# Patient Record
Sex: FEMALE | Race: OTHER | NOT HISPANIC OR LATINO
[De-identification: names, ages, dates, MRNs, and addresses within clinical notes are randomized per-mention and may not be internally consistent; named-entity substitution may affect disease eponyms.]

---

## 2017-07-07 ENCOUNTER — TRANSCRIPTION ENCOUNTER (OUTPATIENT)
Age: 67
End: 2017-07-07

## 2017-09-27 VITALS
HEART RATE: 81 BPM | HEIGHT: 63 IN | WEIGHT: 170 LBS | DIASTOLIC BLOOD PRESSURE: 82 MMHG | RESPIRATION RATE: 15 BRPM | BODY MASS INDEX: 30.12 KG/M2 | SYSTOLIC BLOOD PRESSURE: 147 MMHG

## 2017-11-28 ENCOUNTER — RECORD ABSTRACTING (OUTPATIENT)
Age: 67
End: 2017-11-28

## 2017-11-28 DIAGNOSIS — Z87.09 PERSONAL HISTORY OF OTHER DISEASES OF THE RESPIRATORY SYSTEM: ICD-10-CM

## 2017-12-21 ENCOUNTER — APPOINTMENT (OUTPATIENT)
Dept: ENDOCRINOLOGY | Facility: CLINIC | Age: 67
End: 2017-12-21

## 2018-04-26 ENCOUNTER — APPOINTMENT (OUTPATIENT)
Dept: ENDOCRINOLOGY | Facility: CLINIC | Age: 68
End: 2018-04-26

## 2018-07-18 ENCOUNTER — APPOINTMENT (OUTPATIENT)
Dept: INTERNAL MEDICINE | Facility: CLINIC | Age: 68
End: 2018-07-18

## 2018-08-20 ENCOUNTER — APPOINTMENT (OUTPATIENT)
Dept: INTERNAL MEDICINE | Facility: CLINIC | Age: 68
End: 2018-08-20
Payer: COMMERCIAL

## 2018-08-20 ENCOUNTER — NON-APPOINTMENT (OUTPATIENT)
Age: 68
End: 2018-08-20

## 2018-08-20 VITALS
TEMPERATURE: 97.8 F | WEIGHT: 173 LBS | OXYGEN SATURATION: 97 % | BODY MASS INDEX: 30.65 KG/M2 | SYSTOLIC BLOOD PRESSURE: 160 MMHG | DIASTOLIC BLOOD PRESSURE: 80 MMHG | HEART RATE: 62 BPM | RESPIRATION RATE: 16 BRPM | HEIGHT: 63 IN

## 2018-08-20 DIAGNOSIS — Z80.0 FAMILY HISTORY OF MALIGNANT NEOPLASM OF DIGESTIVE ORGANS: ICD-10-CM

## 2018-08-20 DIAGNOSIS — Z82.49 FAMILY HISTORY OF ISCHEMIC HEART DISEASE AND OTHER DISEASES OF THE CIRCULATORY SYSTEM: ICD-10-CM

## 2018-08-20 DIAGNOSIS — Z80.3 FAMILY HISTORY OF MALIGNANT NEOPLASM OF BREAST: ICD-10-CM

## 2018-08-20 DIAGNOSIS — Z80.8 FAMILY HISTORY OF MALIGNANT NEOPLASM OF OTHER ORGANS OR SYSTEMS: ICD-10-CM

## 2018-08-20 DIAGNOSIS — Z80.41 FAMILY HISTORY OF MALIGNANT NEOPLASM OF OVARY: ICD-10-CM

## 2018-08-20 DIAGNOSIS — Z81.8 FAMILY HISTORY OF OTHER MENTAL AND BEHAVIORAL DISORDERS: ICD-10-CM

## 2018-08-20 DIAGNOSIS — Z87.891 PERSONAL HISTORY OF NICOTINE DEPENDENCE: ICD-10-CM

## 2018-08-20 DIAGNOSIS — Z80.52 FAMILY HISTORY OF MALIGNANT NEOPLASM OF BLADDER: ICD-10-CM

## 2018-08-20 PROCEDURE — 93000 ELECTROCARDIOGRAM COMPLETE: CPT

## 2018-08-20 PROCEDURE — 99387 INIT PM E/M NEW PAT 65+ YRS: CPT | Mod: 25

## 2018-08-20 RX ORDER — ATENOLOL 50 MG/1
50 TABLET ORAL
Refills: 0 | Status: DISCONTINUED | COMMUNITY

## 2018-08-20 RX ORDER — CITALOPRAM HYDROBROMIDE 40 MG/1
40 TABLET, FILM COATED ORAL DAILY
Qty: 30 | Refills: 0 | Status: ACTIVE | COMMUNITY

## 2018-08-20 RX ORDER — SIMVASTATIN 10 MG/1
10 TABLET, FILM COATED ORAL
Refills: 0 | Status: DISCONTINUED | COMMUNITY
End: 2018-08-20

## 2018-08-20 RX ORDER — VALSARTAN 160 MG/1
160 TABLET, COATED ORAL
Refills: 0 | Status: DISCONTINUED | COMMUNITY

## 2018-08-20 RX ORDER — ALBUTEROL SULFATE 90 UG/1
AEROSOL, METERED RESPIRATORY (INHALATION)
Refills: 0 | Status: DISCONTINUED | COMMUNITY
End: 2018-08-20

## 2018-08-20 RX ORDER — TRAZODONE HYDROCHLORIDE 50 MG/1
50 TABLET ORAL
Refills: 0 | Status: ACTIVE | COMMUNITY

## 2018-08-20 NOTE — HISTORY OF PRESENT ILLNESS
[No Weight Changes] : No weight changes [None] : No weight lost attempts [Following Diet Successfully] : Following the diet successfully [Sedentary] : Patient is sedentary [Following  treatment as advised] : Patient is following  treatment as advised [Contemplation] : Contemplation: patient is considering to lose weight within the next 6 months, patient did not attempt to lose weight in the last year. [Good understanding] : Patient has a good understanding of disease, goals and obesity follow-up plan [de-identified] : 67 year old female patient with history of stable Hypertension, Hypercholesterolemia, Bipolar Disorder, Graves Disease, Pituitary Tumor, history as stated, presented for an initial annual preventative examination. Patient denies any associated symptoms of shortness of breath, chest pain, abdominal pain at this time.\par \par

## 2018-08-20 NOTE — ASSESSMENT
[FreeTextEntry1] : 67 year old female found to have stable Hypertension, Hypercholesterolemia, Bipolar Disorder, Graves Disease, Pituitary Tumor,with the current regimen, diet and life style modifications, as counseled. Plan as ordered.\par EKG showed NSR at the rate of 61 BPM, LAD, non-sp ST-T changes noted.\par Patient is refusing all immunizations, in spite of counseling risks and benefits.\par

## 2018-08-20 NOTE — HEALTH RISK ASSESSMENT
[Good] : ~his/her~  mood as  good [No falls in past year] : Patient reported no falls in the past year [0] : 2) Feeling down, depressed, or hopeless: Not at all (0) [Patient reported mammogram was normal] : Patient reported mammogram was normal [Patient reported bone density results were normal] : Patient reported bone density results were normal [HIV test declined] : HIV test declined [Hepatitis C test offered] : Hepatitis C test offered [None] : None [Feels Safe at Home] : Feels safe at home [Fully functional (bathing, dressing, toileting, transferring, walking, feeding)] : Fully functional (bathing, dressing, toileting, transferring, walking, feeding) [Fully functional (using the telephone, shopping, preparing meals, housekeeping, doing laundry, using] : Fully functional and needs no help or supervision to perform IADLs (using the telephone, shopping, preparing meals, housekeeping, doing laundry, using transportation, managing medications and managing finances) [Smoke Detector] : smoke detector [Carbon Monoxide Detector] : carbon monoxide detector [Seat Belt] :  uses seat belt [Sunscreen] : uses sunscreen [Discussed at today's visit] : Advance Directives Discussed at today's visit [FreeTextEntry1] : Check up\par  [] : No [de-identified] : None [[id number]] : 0 [Change in mental status noted] : No change in mental status noted [Reports changes in hearing] : Reports no changes in hearing [Reports changes in vision] : Reports no changes in vision [Reports changes in dental health] : Reports no changes in dental health [MammogramDate] : 04/17 [BoneDensityDate] : 04/17 [ColonoscopyComments] : As ordered for today.

## 2018-09-15 ENCOUNTER — APPOINTMENT (OUTPATIENT)
Dept: INTERNAL MEDICINE | Facility: CLINIC | Age: 68
End: 2018-09-15
Payer: COMMERCIAL

## 2018-09-15 VITALS
BODY MASS INDEX: 30.48 KG/M2 | DIASTOLIC BLOOD PRESSURE: 80 MMHG | OXYGEN SATURATION: 95 % | SYSTOLIC BLOOD PRESSURE: 130 MMHG | HEART RATE: 74 BPM | WEIGHT: 172 LBS | TEMPERATURE: 97.9 F | HEIGHT: 63 IN | RESPIRATION RATE: 15 BRPM

## 2018-09-15 LAB — NONINV COLON CA DNA+OCC BLD SCRN STL QL: NEGATIVE

## 2018-09-15 PROCEDURE — 99214 OFFICE O/P EST MOD 30 MIN: CPT

## 2018-09-15 NOTE — ASSESSMENT
[FreeTextEntry1] : 67 year old female found to have stable  Hypertension, Hypercholesterolemia, Bipolar Disorder, Graves Disease, Pituitary Tumor,with the current regimen, diet and life style modifications, as counseled. Prior results reviewed and discussed with the patient during today's examination. Plan as ordered.\par

## 2018-09-15 NOTE — HISTORY OF PRESENT ILLNESS
[de-identified] : 67 year old  female patient with history of stable Hypertension, Hypercholesterolemia, Bipolar Disorder, Graves Disease, Pituitary Tumor, history as stated, presented for follow up examination of Elevated BP checked. Patient is compliant with all medications. Denies shortness of breath, chest pain or abdominal pains at this time. ROS as stated.\par

## 2018-09-15 NOTE — HEALTH RISK ASSESSMENT
[No falls in past year] : Patient reported no falls in the past year [0] : 2) Feeling down, depressed, or hopeless: Not at all (0) [] : No [de-identified] : None [[id number]] : 0

## 2018-09-27 ENCOUNTER — APPOINTMENT (OUTPATIENT)
Dept: ENDOCRINOLOGY | Facility: CLINIC | Age: 68
End: 2018-09-27
Payer: COMMERCIAL

## 2018-09-27 VITALS
WEIGHT: 173 LBS | HEIGHT: 63 IN | DIASTOLIC BLOOD PRESSURE: 70 MMHG | OXYGEN SATURATION: 96 % | SYSTOLIC BLOOD PRESSURE: 127 MMHG | BODY MASS INDEX: 30.65 KG/M2 | RESPIRATION RATE: 16 BRPM | TEMPERATURE: 97.8 F | HEART RATE: 63 BPM

## 2018-09-27 PROCEDURE — 99214 OFFICE O/P EST MOD 30 MIN: CPT

## 2018-10-05 ENCOUNTER — MEDICATION RENEWAL (OUTPATIENT)
Age: 68
End: 2018-10-05

## 2018-11-25 ENCOUNTER — TRANSCRIPTION ENCOUNTER (OUTPATIENT)
Age: 68
End: 2018-11-25

## 2018-11-30 ENCOUNTER — APPOINTMENT (OUTPATIENT)
Dept: INTERNAL MEDICINE | Facility: CLINIC | Age: 68
End: 2018-11-30
Payer: COMMERCIAL

## 2018-11-30 VITALS
BODY MASS INDEX: 30.3 KG/M2 | TEMPERATURE: 97.9 F | OXYGEN SATURATION: 98 % | SYSTOLIC BLOOD PRESSURE: 130 MMHG | DIASTOLIC BLOOD PRESSURE: 80 MMHG | HEIGHT: 63 IN | RESPIRATION RATE: 16 BRPM | HEART RATE: 70 BPM | WEIGHT: 171 LBS

## 2018-11-30 PROCEDURE — 99214 OFFICE O/P EST MOD 30 MIN: CPT

## 2018-11-30 NOTE — HEALTH RISK ASSESSMENT
[No falls in past year] : Patient reported no falls in the past year [0] : 2) Feeling down, depressed, or hopeless: Not at all (0) [] : No [de-identified] : ENDO [[id number]] : 0

## 2018-11-30 NOTE — ASSESSMENT
[FreeTextEntry1] : 68 year old female found to have stable Hypertension, Hypercholesterolemia,with the current regimen, diet and life style modifications, as counseled. Prior results reviewed and discussed with the patient during today's examination. Plan as ordered.\par Current symptoms are consistent with Reactive Airway Disease, prescription management and further followup as ordered.\par Patient was recently evaluated by ENDO, findings and recommendations reviewed with the patient during today's examination.\par

## 2018-11-30 NOTE — HISTORY OF PRESENT ILLNESS
[Moderate] : moderate [___ Days ago] : [unfilled] days ago [Constant] : constant [Congestion] : congestion [Cough] : cough [Wheezing] : wheezing [Fatigue] : fatigue [Fever] : fever [Rest] : rest [Activity] : with activity [Worsening] : worsening [Sore Throat] : no sore throat [Chills] : no chills [Anorexia] : no anorexia [Shortness Of Breath] : no shortness of breath [Earache] : no earache [Headache] : no headache [de-identified] : 68 year old  female patient with history as stated, presented with above mentioned complaint.

## 2018-12-08 ENCOUNTER — APPOINTMENT (OUTPATIENT)
Dept: INTERNAL MEDICINE | Facility: CLINIC | Age: 68
End: 2018-12-08

## 2019-01-03 ENCOUNTER — APPOINTMENT (OUTPATIENT)
Age: 69
End: 2019-01-03

## 2019-01-17 ENCOUNTER — APPOINTMENT (OUTPATIENT)
Dept: ENDOCRINOLOGY | Facility: CLINIC | Age: 69
End: 2019-01-17

## 2019-01-24 ENCOUNTER — APPOINTMENT (OUTPATIENT)
Dept: UROLOGY | Facility: CLINIC | Age: 69
End: 2019-01-24
Payer: COMMERCIAL

## 2019-01-24 VITALS
TEMPERATURE: 98.1 F | WEIGHT: 176 LBS | BODY MASS INDEX: 31.18 KG/M2 | DIASTOLIC BLOOD PRESSURE: 84 MMHG | SYSTOLIC BLOOD PRESSURE: 124 MMHG | HEIGHT: 63 IN

## 2019-01-24 PROCEDURE — 99204 OFFICE O/P NEW MOD 45 MIN: CPT

## 2019-01-24 RX ORDER — AMOXICILLIN AND CLAVULANATE POTASSIUM 500; 125 MG/1; MG/1
500-125 TABLET, FILM COATED ORAL TWICE DAILY
Qty: 14 | Refills: 0 | Status: COMPLETED | COMMUNITY
Start: 2018-11-30 | End: 2019-01-24

## 2019-01-24 RX ORDER — PROMETHAZINE HYDROCHLORIDE AND DEXTROMETHORPHAN HYDROBROMIDE ORAL SOLUTION 15; 6.25 MG/5ML; MG/5ML
6.25-15 SOLUTION ORAL
Qty: 210 | Refills: 2 | Status: COMPLETED | COMMUNITY
Start: 2018-11-30 | End: 2019-01-24

## 2019-01-24 RX ORDER — PREDNISONE 10 MG/1
10 TABLET ORAL
Qty: 21 | Refills: 0 | Status: COMPLETED | COMMUNITY
Start: 2018-11-30 | End: 2019-01-24

## 2019-01-27 NOTE — HISTORY OF PRESENT ILLNESS
[FreeTextEntry1] : 69 yo F p/w 6 months of LUTS\par voiding every hour sometimes during the day\par nocturia 5-6 times/night\par Drinks 1.5L per day and before more. Noticed improvement since cutting back\par 2 cups of coffee daily\par vaginal irritation\par Some stress incontinence and wears pads, changing twice per day\par no gross hematuria\par mother had bladder cancer diagnosed at 69yo\par normal bowel movements\par

## 2019-01-27 NOTE — REVIEW OF SYSTEMS
[see HPI] : see HPI [Urine retention] : urine retention [Wake up at night to urinate  How many times?  ___] : wakes up to urinate [unfilled] times during the night [Negative] : Heme/Lymph [FreeTextEntry1] : vaginal irritation

## 2019-01-27 NOTE — PHYSICAL EXAM

## 2019-01-27 NOTE — ASSESSMENT
[FreeTextEntry1] : 67 yo F with LUTS\par \par - PVR = 0ml\par - Discussed possible etiologies for LUTS. Discussed ways to manage them including behavioral modifications such as adequate hydration, controlling constipation, restricting fluids in the evening\par - UA and culture\par - cystoscopy

## 2019-01-28 ENCOUNTER — RESULT REVIEW (OUTPATIENT)
Age: 69
End: 2019-01-28

## 2019-01-28 LAB
APPEARANCE: CLEAR
BACTERIA UR CULT: ABNORMAL
BACTERIA: NEGATIVE
BILIRUBIN URINE: NEGATIVE
BLOOD URINE: NEGATIVE
COLOR: YELLOW
GLUCOSE QUALITATIVE U: NEGATIVE MG/DL
HYALINE CASTS: 1 /LPF
KETONES URINE: NEGATIVE
LEUKOCYTE ESTERASE URINE: NEGATIVE
MICROSCOPIC-UA: NORMAL
NITRITE URINE: NEGATIVE
PH URINE: 7
PROTEIN URINE: NEGATIVE MG/DL
RED BLOOD CELLS URINE: 2 /HPF
SPECIFIC GRAVITY URINE: 1.02
SQUAMOUS EPITHELIAL CELLS: 5 /HPF
UROBILINOGEN URINE: NEGATIVE MG/DL
WHITE BLOOD CELLS URINE: 6 /HPF

## 2019-01-29 ENCOUNTER — RESULT REVIEW (OUTPATIENT)
Age: 69
End: 2019-01-29

## 2019-02-16 ENCOUNTER — APPOINTMENT (OUTPATIENT)
Dept: INTERNAL MEDICINE | Facility: CLINIC | Age: 69
End: 2019-02-16

## 2019-02-25 LAB
APPEARANCE: CLEAR
BACTERIA UR CULT: NORMAL
BACTERIA: NEGATIVE
BILIRUBIN URINE: NEGATIVE
BLOOD URINE: NEGATIVE
COLOR: YELLOW
GLUCOSE QUALITATIVE U: NEGATIVE
HYALINE CASTS: 1 /LPF
KETONES URINE: NEGATIVE
LEUKOCYTE ESTERASE URINE: NEGATIVE
MICROSCOPIC-UA: NORMAL
NITRITE URINE: NEGATIVE
PH URINE: 6.5
PROTEIN URINE: NEGATIVE
RED BLOOD CELLS URINE: 5 /HPF
SPECIFIC GRAVITY URINE: 1.03
SQUAMOUS EPITHELIAL CELLS: 3 /HPF
UROBILINOGEN URINE: NORMAL
WHITE BLOOD CELLS URINE: 1 /HPF

## 2019-02-28 ENCOUNTER — APPOINTMENT (OUTPATIENT)
Age: 69
End: 2019-02-28
Payer: COMMERCIAL

## 2019-02-28 DIAGNOSIS — R31.29 OTHER MICROSCOPIC HEMATURIA: ICD-10-CM

## 2019-02-28 PROCEDURE — 52000 CYSTOURETHROSCOPY: CPT

## 2019-03-04 LAB
BUN SERPL-MCNC: 20 MG/DL
CREAT SERPL-MCNC: 0.64 MG/DL

## 2019-03-11 ENCOUNTER — RX RENEWAL (OUTPATIENT)
Age: 69
End: 2019-03-11

## 2019-03-19 ENCOUNTER — APPOINTMENT (OUTPATIENT)
Dept: INTERNAL MEDICINE | Facility: CLINIC | Age: 69
End: 2019-03-19
Payer: COMMERCIAL

## 2019-03-19 VITALS
TEMPERATURE: 97.4 F | BODY MASS INDEX: 30.83 KG/M2 | DIASTOLIC BLOOD PRESSURE: 80 MMHG | HEIGHT: 63 IN | OXYGEN SATURATION: 97 % | RESPIRATION RATE: 16 BRPM | WEIGHT: 174 LBS | HEART RATE: 70 BPM | SYSTOLIC BLOOD PRESSURE: 130 MMHG

## 2019-03-19 PROCEDURE — 99213 OFFICE O/P EST LOW 20 MIN: CPT

## 2019-03-19 RX ORDER — BENZONATATE 100 MG/1
100 CAPSULE ORAL
Qty: 40 | Refills: 0 | Status: DISCONTINUED | COMMUNITY
Start: 2018-11-25

## 2019-03-19 RX ORDER — CIPROFLOXACIN HYDROCHLORIDE 500 MG/1
500 TABLET, FILM COATED ORAL
Qty: 10 | Refills: 0 | Status: DISCONTINUED | COMMUNITY
Start: 2019-01-28 | End: 2019-03-19

## 2019-03-19 RX ORDER — CICLOPIROX 80 MG/ML
8 SOLUTION TOPICAL
Qty: 7 | Refills: 0 | Status: DISCONTINUED | COMMUNITY
Start: 2018-11-16

## 2019-03-19 NOTE — HISTORY OF PRESENT ILLNESS
[de-identified] : 68 year old  female patient with history of stable Hypertension, Hypercholesterolemia, Reactive Airway Disease, history as stated, presented for follow up examination. Patient is compliant with all medications. Denies shortness of breath, chest pain or abdominal pains at this time. ROS as stated.\par

## 2019-03-19 NOTE — ASSESSMENT
[FreeTextEntry1] : 68 year old female found to have stable Hypertension, Hypercholesterolemia, Reactive Airway Disease,with the current regimen, diet and life style modifications, as counseled. Prior results reviewed and discussed with the patient during today's examination. Plan as ordered.\par GI/CARD F/Us, as counseled.\par Patient was recently evaluated by ENDO/URO, findings and recommendations reviewed with the patient during today's examination.\par

## 2019-04-05 ENCOUNTER — APPOINTMENT (OUTPATIENT)
Dept: GASTROENTEROLOGY | Facility: CLINIC | Age: 69
End: 2019-04-05
Payer: COMMERCIAL

## 2019-04-05 VITALS
WEIGHT: 180 LBS | BODY MASS INDEX: 31.89 KG/M2 | HEART RATE: 68 BPM | DIASTOLIC BLOOD PRESSURE: 82 MMHG | HEIGHT: 63 IN | OXYGEN SATURATION: 97 % | SYSTOLIC BLOOD PRESSURE: 159 MMHG | TEMPERATURE: 97.8 F

## 2019-04-05 PROCEDURE — 99204 OFFICE O/P NEW MOD 45 MIN: CPT

## 2019-04-05 NOTE — ASSESSMENT
[FreeTextEntry1] : Abnormal CAT Scan: The patient was being evaluated for hematuria by her urologist, Dr. Cheri Jean.  The CAT scan of the abdomen and pelvis with IV contrast performed on March 8, 2019 revealed a polyp versus adherent stool in the hepatic flexure and a small hiatal hernia. I recommend a colonoscopy to assess the abnormal CAT scan, family history of colon cancer, constipation,change in bowel habits and change in caliber of stool.  The patient was told of the risks and benefits of the procedure.  The patient was told of the risks of perforation, emergency surgery, bleeding, infections and missed lesions.  The patient agreed and will schedule for the procedure. The patient can take the antihypertensive medication with a sip of water one hour prior to the procedure. The patient is to be n.p.o. after midnight and bowel prep was given.  The patient is to return for the procedure.  \par Constipation: The patient complains of constipation. I recommend a high-fiber diet. The patient can consider a trial of a probiotic such as Align once a day. The patient can consider a trial of Metamucil once a day for fiber supplementation.\par  Dysphagia: The patient complains of dysphagia of unclear etiology.   I recommend an upper endoscopy to assess for peptic ulcer disease versus esophagitis.  The patient was told of the risks and benefits of the procedure.  The patient was told of the risks of perforation, emergency surgery, bleeding, infections and missed lesions. The patient agreed and will schedule for procedure. The patient is to be n.p.o. after midnight.  The patient is to return for the procedure. the patient may require motility studies to assess the etiology of the dysphagia.  The patient agrees and will follow-up for further work-up and treatment.\par I recommend checking stool guaiac for occult blood.\par The patient is to follow-up in the office in 2 to 3 weeks to reassess the symptoms.   The patient was told to call the office if any further problems.

## 2019-04-05 NOTE — REVIEW OF SYSTEMS
[Feeling Tired] : feeling tired [Eyesight Problems] : eyesight problems [Nasal Discharge] : nasal discharge [SOB on Exertion] : shortness of breath during exertion [Constipation] : constipation [As Noted in HPI] : as noted in HPI [Anxiety] : anxiety [Depression] : depression [Hot Flashes] : hot flashes [Negative] : Heme/Lymph [FreeTextEntry8] : hematuria

## 2019-04-05 NOTE — HISTORY OF PRESENT ILLNESS
[Heartburn] : denies heartburn [Nausea] : denies nausea [Vomiting] : denies vomiting [Diarrhea] : denies diarrhea [Yellow Skin Or Eyes (Jaundice)] : denies jaundice [Abdominal Pain] : denies abdominal pain [Rectal Pain] : denies rectal pain [Constipation] : constipation [Abdominal Swelling] : abdominal swelling [Wt Gain ___ Lbs] : no recent weight gain [Wt Loss ___ Lbs] : no recent weight loss [GERD] : no gastroesophageal reflux disease [Hiatus Hernia] : no hiatus hernia [Peptic Ulcer Disease] : no peptic ulcer disease [Cholelithiasis] : no cholelithiasis [Kidney Stone] : no kidney stone [Inflammatory Bowel Disease] : no inflammatory bowel disease [Irritable Bowel Syndrome] : no irritable bowel syndrome [Diverticulitis] : no diverticulitis [Alcohol Abuse] : no alcohol abuse [Malignancy] : no malignancy [Abdominal Surgery] : no abdominal surgery [Appendectomy] : no appendectomy [Cholecystectomy] : no cholecystectomy [de-identified] : The patient is a 68-year-old  female with past medical history significant for hypertension, hypercholesterolemia, bipolar disorder, Grave’s disease and pituitary tumor who was referred to my office by Dr. Michael Arguello for an abnormal CAT scan of the abdomen and pelvis with IV contrast. The patient also admits to having dyspepsia, dysphagia, constipation, change in bowel habits and change in caliber of stool. I was asked to render an opinion for consultation for the above complaints.   The patient states that she is feeling fine.  The patient was being evaluated for hematuria.  She is being followed by her urologist, Dr. Cheri Jean.  The CAT scan of the abdomen and pelvis with IV contrast performed on 2019 revealed a polyp versus adherent stool in the hepatic flexure and a small hiatal hernia. The patient denies any abdominal pain.  The patient complains of abdominal gas and bloating.  The patient denies any nausea or vomiting.  The patient complains of dysphagia but denies any gastroesophageal reflux disease. The dysphagia is worse with solids but unrelated to liquids. The patient complains of dyspnea upon exertion but denies any atypical chest pain, shortness of breath or palpitations.  The patient denies any diaphoresis. The patient complains of constipation but denies any diarrhea.  The patient has 1 bowel movement every 1 to 2 days.  The patient complains of a change in bowel habits.  The patient complains of a change in caliber of stool.   The patient denies having mucus discharge with the bowel movements.  The patient denies any bright red blood per rectum, melena or hematemesis.  The patient denies any rectal pain or rectal pruritus. The patient denies any weight loss or anorexia.  She denies any fevers or chills.  The patient complains of occasional pruritus but denies any jaundice or pruritus.  The patient denies any back pain.  The patient had a colonoscopy to the cecum performed at the office on October 15, 2004.  The study had some retained solid and liquid stool scattered throughout the colon but no gross lesions were noted.   The findings revealed some scattered left-sided diverticulosis and small internal hemorrhoids.  There were no polyps, masses, AVMs or colitis noted.  The patient tolerated the procedure well. The patient denies having a recent upper endoscopy and colonoscopy performed by another gastroenterologist. The patient was evaluated by otolaryngologist, Dr. Morocho.  According to the patient, the otolaryngoscopy was unremarkable. The patient's last menstrual period was age 50, s/p partial hysterectomy. The patient is a .  The patient's first menstrual period was at age 13. The patient admits to a family history of GI problems.  The patient’s sister had a history of colon cancer, s/p colon resection and chemotherapy and 2 sisters and niece with gallbladder disease.

## 2019-04-08 LAB — HEMOCCULT STL QL: NEGATIVE

## 2019-05-07 ENCOUNTER — RESULT REVIEW (OUTPATIENT)
Age: 69
End: 2019-05-07

## 2019-05-07 ENCOUNTER — OUTPATIENT (OUTPATIENT)
Dept: OUTPATIENT SERVICES | Facility: HOSPITAL | Age: 69
LOS: 1 days | Discharge: HOME | End: 2019-05-07
Payer: COMMERCIAL

## 2019-05-07 ENCOUNTER — APPOINTMENT (OUTPATIENT)
Dept: GASTROENTEROLOGY | Facility: HOSPITAL | Age: 69
End: 2019-05-07

## 2019-05-07 DIAGNOSIS — K59.00 CONSTIPATION, UNSPECIFIED: ICD-10-CM

## 2019-05-07 DIAGNOSIS — R93.3 ABNORMAL FINDINGS ON DIAGNOSTIC IMAGING OF OTHER PARTS OF DIGESTIVE TRACT: ICD-10-CM

## 2019-05-07 PROCEDURE — 88305 TISSUE EXAM BY PATHOLOGIST: CPT

## 2019-05-07 PROCEDURE — 43239 EGD BIOPSY SINGLE/MULTIPLE: CPT

## 2019-05-07 PROCEDURE — 88312 SPECIAL STAINS GROUP 1: CPT | Mod: 26

## 2019-05-07 PROCEDURE — 45385 COLONOSCOPY W/LESION REMOVAL: CPT

## 2019-05-07 PROCEDURE — 88312 SPECIAL STAINS GROUP 1: CPT

## 2019-05-07 PROCEDURE — 88305 TISSUE EXAM BY PATHOLOGIST: CPT | Mod: 26

## 2019-05-09 LAB — SURGICAL PATHOLOGY STUDY: SIGNIFICANT CHANGE UP

## 2019-05-11 ENCOUNTER — RX RENEWAL (OUTPATIENT)
Age: 69
End: 2019-05-11

## 2019-06-24 ENCOUNTER — APPOINTMENT (OUTPATIENT)
Dept: CARDIOLOGY | Facility: CLINIC | Age: 69
End: 2019-06-24
Payer: MEDICARE

## 2019-06-24 VITALS
HEART RATE: 73 BPM | TEMPERATURE: 97.4 F | DIASTOLIC BLOOD PRESSURE: 77 MMHG | BODY MASS INDEX: 32.43 KG/M2 | HEIGHT: 63 IN | WEIGHT: 183 LBS | OXYGEN SATURATION: 97 % | SYSTOLIC BLOOD PRESSURE: 146 MMHG

## 2019-06-24 PROCEDURE — 99205 OFFICE O/P NEW HI 60 MIN: CPT

## 2019-06-24 PROCEDURE — 93000 ELECTROCARDIOGRAM COMPLETE: CPT

## 2019-06-27 ENCOUNTER — APPOINTMENT (OUTPATIENT)
Dept: ENDOCRINOLOGY | Facility: CLINIC | Age: 69
End: 2019-06-27
Payer: MEDICARE

## 2019-06-27 VITALS
HEART RATE: 69 BPM | RESPIRATION RATE: 16 BRPM | DIASTOLIC BLOOD PRESSURE: 55 MMHG | HEIGHT: 63 IN | SYSTOLIC BLOOD PRESSURE: 102 MMHG | WEIGHT: 184 LBS | OXYGEN SATURATION: 95 % | TEMPERATURE: 97.7 F | BODY MASS INDEX: 32.6 KG/M2

## 2019-06-27 PROCEDURE — 99214 OFFICE O/P EST MOD 30 MIN: CPT

## 2019-06-27 NOTE — PHYSICAL EXAM
[Alert] : alert [No Acute Distress] : no acute distress [Normal Sclera/Conjunctiva] : normal sclera/conjunctiva [PERRL] : pupils equal, round and reactive to light [Normal Outer Ear/Nose] : the ears and nose were normal in appearance [Normal Hearing] : hearing was normal [No Neck Mass] : no neck mass was observed [Thyroid Not Enlarged] : the thyroid was not enlarged [No Respiratory Distress] : no respiratory distress [Normal Rate and Effort] : normal respiratory rhythm and effort [Normal PMI] : the apical impulse was normal [Normal Rate] : heart rate was normal  [Carotids Normal] : carotid pulses were normal with no bruits [Abdominal Aorta Normal] : the abdominal aorta was normal [Hirsutism] : hirsutism [Normal Reflexes] : deep tendon reflexes were 2+ and symmetric [No Motor Deficits] : the motor exam was normal

## 2019-06-27 NOTE — HISTORY OF PRESENT ILLNESS
[FreeTextEntry1] : Patient feels well, she does not feel nervous or anxious. No tremor of the hands. She denies palpitations, denies heat intolerance. [Her/His] weight has improved.  Her weight has increased. She denies palpitations, increased perspiration. The goiter is unchanged. She has no difficulty swallowing, has/no neck pain. Denies eye problems. No history of neck radiation. The CT scan of the abdomen was negative on 3/19. Her DHEA-S was elevated, she has hirsutism\par \par

## 2019-06-29 ENCOUNTER — RX RENEWAL (OUTPATIENT)
Age: 69
End: 2019-06-29

## 2019-07-10 ENCOUNTER — FORM ENCOUNTER (OUTPATIENT)
Age: 69
End: 2019-07-10

## 2019-07-11 ENCOUNTER — OUTPATIENT (OUTPATIENT)
Dept: OUTPATIENT SERVICES | Facility: HOSPITAL | Age: 69
LOS: 1 days | End: 2019-07-11
Payer: MEDICARE

## 2019-07-11 DIAGNOSIS — R06.02 SHORTNESS OF BREATH: ICD-10-CM

## 2019-07-11 PROCEDURE — 78452 HT MUSCLE IMAGE SPECT MULT: CPT

## 2019-07-11 PROCEDURE — A9502: CPT

## 2019-07-11 PROCEDURE — 93017 CV STRESS TEST TRACING ONLY: CPT

## 2019-07-11 PROCEDURE — 93306 TTE W/DOPPLER COMPLETE: CPT

## 2019-07-11 PROCEDURE — 93306 TTE W/DOPPLER COMPLETE: CPT | Mod: 26

## 2019-08-21 NOTE — COUNSELING
[Weight management counseling provided] : Weight management [Activity counseling provided] : activity [Healthy eating counseling provided] : healthy eating [None] : None [Good understanding] : Patient has a good understanding of lifestyle changes and the steps needed to achieve self management goals

## 2019-08-22 ENCOUNTER — APPOINTMENT (OUTPATIENT)
Dept: INTERNAL MEDICINE | Facility: CLINIC | Age: 69
End: 2019-08-22
Payer: MEDICARE

## 2019-08-22 VITALS
OXYGEN SATURATION: 98 % | SYSTOLIC BLOOD PRESSURE: 130 MMHG | DIASTOLIC BLOOD PRESSURE: 80 MMHG | TEMPERATURE: 97.9 F | RESPIRATION RATE: 16 BRPM | WEIGHT: 178 LBS | HEART RATE: 88 BPM | HEIGHT: 63 IN | BODY MASS INDEX: 31.54 KG/M2

## 2019-08-22 PROCEDURE — 99214 OFFICE O/P EST MOD 30 MIN: CPT

## 2019-08-22 NOTE — ASSESSMENT
[FreeTextEntry1] : 68 year old female found to have stable Hypertension, Hypercholesterolemia, Reactive Airway Disease,with the current regimen, diet and life style modifications, as counseled. Prior results reviewed and discussed with the patient during today's examination. Plan as ordered.\par Patient was recently evaluated by ENDO/CARD/GI findings and recommendations reviewed with the patient during today's examination.\par

## 2019-08-22 NOTE — HISTORY OF PRESENT ILLNESS
[de-identified] : 68 year old  female patient with history of stable Hypertension, Hypercholesterolemia, Reactive Airway Disease, history as stated, presented for follow up examination. Patient is compliant with all medications. Denies shortness of breath, chest pain or abdominal pains at this time. ROS as stated.\par

## 2019-08-22 NOTE — HEALTH RISK ASSESSMENT
[No] : In the past 12 months have you used drugs other than those required for medical reasons? No [No falls in past year] : Patient reported no falls in the past year [0] : 1) Little interest or pleasure doing things: Not at all (0) [de-identified] : URO [] : No [[id number]] : 0

## 2019-08-27 ENCOUNTER — APPOINTMENT (OUTPATIENT)
Dept: ENDOCRINOLOGY | Facility: CLINIC | Age: 69
End: 2019-08-27
Payer: MEDICARE

## 2019-08-27 VITALS
BODY MASS INDEX: 31.89 KG/M2 | DIASTOLIC BLOOD PRESSURE: 72 MMHG | SYSTOLIC BLOOD PRESSURE: 115 MMHG | RESPIRATION RATE: 16 BRPM | HEIGHT: 63 IN | HEART RATE: 113 BPM | OXYGEN SATURATION: 96 % | WEIGHT: 180 LBS

## 2019-08-27 PROCEDURE — 99214 OFFICE O/P EST MOD 30 MIN: CPT

## 2019-08-27 NOTE — PHYSICAL EXAM
[Alert] : alert [No Acute Distress] : no acute distress [Normal Sclera/Conjunctiva] : normal sclera/conjunctiva [PERRL] : pupils equal, round and reactive to light [Normal Outer Ear/Nose] : the ears and nose were normal in appearance [No Neck Mass] : no neck mass was observed [Normal Hearing] : hearing was normal [No Respiratory Distress] : no respiratory distress [Thyroid Not Enlarged] : the thyroid was not enlarged [Normal PMI] : the apical impulse was normal [Normal Rate and Effort] : normal respiratory rhythm and effort [Normal Rate] : heart rate was normal  [Carotids Normal] : carotid pulses were normal with no bruits [Abdominal Aorta Normal] : the abdominal aorta was normal [Normal Reflexes] : deep tendon reflexes were 2+ and symmetric [Hirsutism] : hirsutism [No Motor Deficits] : the motor exam was normal

## 2019-08-27 NOTE — ASSESSMENT
[FreeTextEntry1] : The patient hirsutism is improving \par The hyperandrogenism has improved.\par Will continue the same treatment\par Advised to walk regularly and follow the diet in order to lose weight\par Her BP is stable, normal\par Will repeat the MRI of the brain at the end of the year

## 2019-08-27 NOTE — HISTORY OF PRESENT ILLNESS
[FreeTextEntry1] : Patient has hirsutism, controlled with Spironolactone. The hair loss has improved. the Free testosterone and DHEA-S that were elevated now they are normal. The TSH and Free t4 were normal. The TSHRAb was negative

## 2019-09-05 ENCOUNTER — APPOINTMENT (OUTPATIENT)
Dept: UROLOGY | Facility: CLINIC | Age: 69
End: 2019-09-05
Payer: MEDICARE

## 2019-09-05 VITALS — SYSTOLIC BLOOD PRESSURE: 121 MMHG | HEIGHT: 63 IN | HEART RATE: 84 BPM | DIASTOLIC BLOOD PRESSURE: 78 MMHG

## 2019-09-05 PROCEDURE — 99213 OFFICE O/P EST LOW 20 MIN: CPT

## 2019-09-05 NOTE — HISTORY OF PRESENT ILLNESS
[FreeTextEntry1] : 67 yo F p/w 6 months of LUTS\par voiding every hour sometimes during the day\par nocturia 5-6 times/night\par Drinks 1.5L per day and before more. Noticed improvement since cutting back\par 2 cups of coffee daily\par vaginal irritation\par Some stress incontinence and wears pads, changing twice per day\par no gross hematuria\par mother had bladder cancer diagnosed at 67yo\par normal bowel movements\par \par 9/5/19 Interval history: Was doing well after last visit in feb with resolution of LUTS\par Until last week - exacerbation of LUTS - urinary frequency, nocturia, urgency\par Had to change her under multiple times due to sudden urge incontinence\par Some dysuria, no gross hematuria\par Drinks 32 ounces of water, 1 cup of coffee\par no fever or chills

## 2019-09-05 NOTE — PHYSICAL EXAM
[General Appearance - Well Developed] : well developed [General Appearance - Well Nourished] : well nourished [Normal Appearance] : normal appearance [Well Groomed] : well groomed [General Appearance - In No Acute Distress] : no acute distress [Abdomen Soft] : soft [Abdomen Tenderness] : non-tender [Costovertebral Angle Tenderness] : no ~M costovertebral angle tenderness [Urinary Bladder Findings] : the bladder was normal on palpation [FreeTextEntry1] : deferred today [Edema] : no peripheral edema [] : no respiratory distress [Respiration, Rhythm And Depth] : normal respiratory rhythm and effort [Exaggerated Use Of Accessory Muscles For Inspiration] : no accessory muscle use [Affect] : the affect was normal [Oriented To Time, Place, And Person] : oriented to person, place, and time [Mood] : the mood was normal [Not Anxious] : not anxious [Normal Station and Gait] : the gait and station were normal for the patient's age [No Focal Deficits] : no focal deficits [No Palpable Adenopathy] : no palpable adenopathy

## 2019-09-05 NOTE — ASSESSMENT
[FreeTextEntry1] : 69 yo F with exacerbation of LUTS, dysuria, likely UTI\par \par - discussed possible etiologies for UTI. Spent extensive period of time discussed behavioral modification including adequate hydration, cutting back on caffeine intake, timed voiding during the day, importance of controlling any diabetes and chronic constipation and how all of these things could potentially increase risk for persistent or recurrent UTI.\par - UA, culture\par - Start Cipro and will change based on sensitivities

## 2019-09-09 LAB
APPEARANCE: CLEAR
BACTERIA UR CULT: NORMAL
BACTERIA: NEGATIVE
BILIRUBIN URINE: NEGATIVE
BLOOD URINE: NEGATIVE
COLOR: YELLOW
GLUCOSE QUALITATIVE U: NEGATIVE
HYALINE CASTS: 1 /LPF
KETONES URINE: NEGATIVE
LEUKOCYTE ESTERASE URINE: NEGATIVE
MICROSCOPIC-UA: NORMAL
NITRITE URINE: NEGATIVE
PH URINE: 6.5
PROTEIN URINE: NEGATIVE
RED BLOOD CELLS URINE: 1 /HPF
SPECIFIC GRAVITY URINE: 1.02
SQUAMOUS EPITHELIAL CELLS: 3 /HPF
UROBILINOGEN URINE: ABNORMAL
WHITE BLOOD CELLS URINE: 1 /HPF

## 2019-09-23 ENCOUNTER — APPOINTMENT (OUTPATIENT)
Dept: CARDIOLOGY | Facility: CLINIC | Age: 69
End: 2019-09-23

## 2019-09-25 RX ORDER — LOSARTAN POTASSIUM AND HYDROCHLOROTHIAZIDE 25; 100 MG/1; MG/1
100-25 TABLET ORAL DAILY
Qty: 30 | Refills: 5 | Status: DISCONTINUED | COMMUNITY
Start: 2018-08-20 | End: 2019-09-25

## 2019-12-18 NOTE — DATA REVIEWED
[FreeTextEntry1] : The thyroid tests were normal bu the free testosterone and the DHEA-S were elevated Propranolol Counseling:  I discussed with the patient the risks of propranolol including but not limited to low heart rate, low blood pressure, low blood sugar, restlessness and increased cold sensitivity. They should call the office if they experience any of these side effects.

## 2020-01-29 ENCOUNTER — APPOINTMENT (OUTPATIENT)
Dept: ENDOCRINOLOGY | Facility: CLINIC | Age: 70
End: 2020-01-29
Payer: MEDICARE

## 2020-01-29 VITALS
OXYGEN SATURATION: 96 % | HEART RATE: 63 BPM | BODY MASS INDEX: 31.36 KG/M2 | SYSTOLIC BLOOD PRESSURE: 143 MMHG | DIASTOLIC BLOOD PRESSURE: 66 MMHG | WEIGHT: 177 LBS | RESPIRATION RATE: 16 BRPM | HEIGHT: 63 IN | TEMPERATURE: 97.7 F

## 2020-01-29 PROCEDURE — 99214 OFFICE O/P EST MOD 30 MIN: CPT

## 2020-01-29 NOTE — HISTORY OF PRESENT ILLNESS
[FreeTextEntry1] : Doing well, the MRI of the pituitary gland was done at the end of 2018. The patient has not been taking the spironolactone regularly. The free testosterone and DHEA-S has increased. She says that she has persistent hair loss. The hirsutism is stable.

## 2020-01-29 NOTE — ASSESSMENT
[FreeTextEntry1] : The patient's hirsutism is stable\par Advised to take the Spironolactone regularly\par Will order an MRI of the pituitary gland\par Will repeat the endocrine work up in 2 months

## 2020-02-12 PROBLEM — Z00.00 ENCOUNTER FOR PREVENTIVE HEALTH EXAMINATION: Status: ACTIVE | Noted: 2017-11-21

## 2020-02-13 ENCOUNTER — APPOINTMENT (OUTPATIENT)
Dept: INTERNAL MEDICINE | Facility: CLINIC | Age: 70
End: 2020-02-13
Payer: MEDICARE

## 2020-02-13 VITALS
WEIGHT: 175 LBS | OXYGEN SATURATION: 94 % | HEART RATE: 67 BPM | DIASTOLIC BLOOD PRESSURE: 80 MMHG | HEIGHT: 63 IN | SYSTOLIC BLOOD PRESSURE: 131 MMHG | BODY MASS INDEX: 31.01 KG/M2 | RESPIRATION RATE: 16 BRPM | TEMPERATURE: 98.1 F

## 2020-02-13 DIAGNOSIS — Z00.00 ENCOUNTER FOR GENERAL ADULT MEDICAL EXAMINATION W/OUT ABNORMAL FINDINGS: ICD-10-CM

## 2020-02-13 PROCEDURE — 99214 OFFICE O/P EST MOD 30 MIN: CPT

## 2020-02-13 NOTE — ASSESSMENT
[FreeTextEntry1] : 69 year old female found to have stable Hypertension, Hypercholesterolemia, Reactive Airway Disease,with the current regimen, diet and life style modifications, as counseled. Prior results reviewed and discussed with the patient during today's examination. Plan as ordered.\par Patient was recently evaluated by ENDO, findings and recommendations reviewed with the patient during today's examination.\par

## 2020-02-13 NOTE — HISTORY OF PRESENT ILLNESS
[de-identified] : 69 year old  female patient with history of stable Hypertension, Hypercholesterolemia, Reactive Airway Disease, history as stated, presented for follow up examination. Patient is compliant with all medications. Denies shortness of breath, chest pain or abdominal pains at this time. ROS as stated.\par

## 2020-02-13 NOTE — HEALTH RISK ASSESSMENT
[No] : In the past 12 months have you used drugs other than those required for medical reasons? No [No falls in past year] : Patient reported no falls in the past year [0] : 2) Feeling down, depressed, or hopeless: Not at all (0) [[id number]] : 0 [] : No [de-identified] : ENDO

## 2020-03-04 ENCOUNTER — APPOINTMENT (OUTPATIENT)
Dept: NEUROLOGY | Facility: CLINIC | Age: 70
End: 2020-03-04

## 2020-03-04 VITALS
DIASTOLIC BLOOD PRESSURE: 66 MMHG | BODY MASS INDEX: 31.01 KG/M2 | HEIGHT: 63 IN | OXYGEN SATURATION: 96 % | TEMPERATURE: 97.9 F | SYSTOLIC BLOOD PRESSURE: 142 MMHG | HEART RATE: 63 BPM | WEIGHT: 175 LBS

## 2020-03-25 ENCOUNTER — APPOINTMENT (OUTPATIENT)
Dept: ENDOCRINOLOGY | Facility: CLINIC | Age: 70
End: 2020-03-25

## 2020-07-02 ENCOUNTER — APPOINTMENT (OUTPATIENT)
Dept: INTERNAL MEDICINE | Facility: CLINIC | Age: 70
End: 2020-07-02
Payer: MEDICARE

## 2020-07-02 ENCOUNTER — NON-APPOINTMENT (OUTPATIENT)
Age: 70
End: 2020-07-02

## 2020-07-02 VITALS
HEIGHT: 63 IN | WEIGHT: 173 LBS | DIASTOLIC BLOOD PRESSURE: 79 MMHG | RESPIRATION RATE: 16 BRPM | BODY MASS INDEX: 30.65 KG/M2 | OXYGEN SATURATION: 96 % | HEART RATE: 61 BPM | TEMPERATURE: 97.6 F | SYSTOLIC BLOOD PRESSURE: 133 MMHG

## 2020-07-02 PROCEDURE — 99214 OFFICE O/P EST MOD 30 MIN: CPT | Mod: 25

## 2020-07-02 PROCEDURE — 93000 ELECTROCARDIOGRAM COMPLETE: CPT

## 2020-07-02 RX ORDER — PROMETHAZINE HYDROCHLORIDE AND DEXTROMETHORPHAN HYDROBROMIDE ORAL SOLUTION 15; 6.25 MG/5ML; MG/5ML
6.25-15 SOLUTION ORAL
Qty: 210 | Refills: 2 | Status: DISCONTINUED | COMMUNITY
Start: 2019-08-22 | End: 2020-07-02

## 2020-07-02 RX ORDER — POLYETHYLENE GLYCOL 3350, SODIUM CHLORIDE, SODIUM BICARBONATE AND POTASSIUM CHLORIDE WITH LEMON FLAVOR 420; 11.2; 5.72; 1.48 G/4L; G/4L; G/4L; G/4L
420 POWDER, FOR SOLUTION ORAL
Qty: 1 | Refills: 0 | Status: DISCONTINUED | COMMUNITY
Start: 2019-04-05 | End: 2020-07-02

## 2020-07-02 RX ORDER — CIPROFLOXACIN HYDROCHLORIDE 500 MG/1
500 TABLET, FILM COATED ORAL TWICE DAILY
Qty: 10 | Refills: 0 | Status: DISCONTINUED | COMMUNITY
Start: 2019-09-05 | End: 2020-07-02

## 2020-07-02 NOTE — HEALTH RISK ASSESSMENT
[No] : In the past 12 months have you used drugs other than those required for medical reasons? No [No falls in past year] : Patient reported no falls in the past year [0] : 2) Feeling down, depressed, or hopeless: Not at all (0) [Independent] : managing finances [] : No [de-identified] : None [[id number]] : 0
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
## 2020-08-13 ENCOUNTER — APPOINTMENT (OUTPATIENT)
Dept: INTERNAL MEDICINE | Facility: CLINIC | Age: 70
End: 2020-08-13

## 2020-08-30 ENCOUNTER — TRANSCRIPTION ENCOUNTER (OUTPATIENT)
Age: 70
End: 2020-08-30

## 2020-11-02 ENCOUNTER — APPOINTMENT (OUTPATIENT)
Dept: INTERNAL MEDICINE | Facility: CLINIC | Age: 70
End: 2020-11-02
Payer: MEDICARE

## 2020-11-02 VITALS
WEIGHT: 180 LBS | TEMPERATURE: 98.1 F | BODY MASS INDEX: 31.89 KG/M2 | HEART RATE: 67 BPM | RESPIRATION RATE: 16 BRPM | DIASTOLIC BLOOD PRESSURE: 94 MMHG | HEIGHT: 63 IN | OXYGEN SATURATION: 96 % | SYSTOLIC BLOOD PRESSURE: 141 MMHG

## 2020-11-02 PROCEDURE — 99072 ADDL SUPL MATRL&STAF TM PHE: CPT

## 2020-11-02 PROCEDURE — 90732 PPSV23 VACC 2 YRS+ SUBQ/IM: CPT

## 2020-11-02 PROCEDURE — G0009: CPT

## 2020-11-02 PROCEDURE — 99214 OFFICE O/P EST MOD 30 MIN: CPT | Mod: 25

## 2020-11-02 NOTE — HEALTH RISK ASSESSMENT
[No] : In the past 12 months have you used drugs other than those required for medical reasons? No [No falls in past year] : Patient reported no falls in the past year [0] : 2) Feeling down, depressed, or hopeless: Not at all (0) [] : No [de-identified] : None [[id number]] : 0

## 2020-11-02 NOTE — HISTORY OF PRESENT ILLNESS
[de-identified] : 70 year old  female patient with history of stable Hypertension, Hypercholesterolemia, Reactive Airway Disease, Bipolar Disorder, history as stated, presented for follow up examination. Patient is compliant with all medications. Denies chest pain or abdominal pains at this time. ROS as stated.\par

## 2020-11-02 NOTE — ASSESSMENT
[FreeTextEntry1] : 70 year old female found to have stable Hypertension, Hypercholesterolemia, Reactive Airway Disease, Bipolar Disorder, under Psych F/Us, with the current regimen, diet and life style modifications, as counseled. Prior results reviewed and discussed with the patient during today's examination. Plan as ordered.\par ENDO/PULM F/Us, as counseled.

## 2020-11-10 ENCOUNTER — APPOINTMENT (OUTPATIENT)
Dept: ENDOCRINOLOGY | Facility: CLINIC | Age: 70
End: 2020-11-10
Payer: MEDICARE

## 2020-11-10 VITALS
OXYGEN SATURATION: 97 % | DIASTOLIC BLOOD PRESSURE: 74 MMHG | HEART RATE: 70 BPM | WEIGHT: 179 LBS | BODY MASS INDEX: 31.71 KG/M2 | SYSTOLIC BLOOD PRESSURE: 125 MMHG | RESPIRATION RATE: 16 BRPM | HEIGHT: 63 IN | TEMPERATURE: 97.9 F

## 2020-11-10 PROCEDURE — 99214 OFFICE O/P EST MOD 30 MIN: CPT | Mod: 25

## 2020-11-10 PROCEDURE — 99072 ADDL SUPL MATRL&STAF TM PHE: CPT

## 2020-11-10 NOTE — DATA REVIEWED
[FreeTextEntry1] : The FBS was borderline elevated. The MRI of the pituitary gland done 2/20 was stable.

## 2020-11-10 NOTE — HISTORY OF PRESENT ILLNESS
[FreeTextEntry1] : Doing well, no weight change. The hirsutism persists. Her free testosterone and DHEA-S were elevated. The pituitary gland hormones were fine.

## 2020-11-10 NOTE — REASON FOR VISIT
[Follow - Up] : a follow-up visit [Hyperthyroidism] : hyperthyroidism [Pituitary Evaluation/ Disorder] : pituitary evaluation/disorder [Other___] : [unfilled]

## 2020-11-10 NOTE — ASSESSMENT
[FreeTextEntry1] : The hirsutism persists\par The hyperandrogenism still present\par Will increase Aldactone 25 mg po BID\par Her BP is normal\par Will repeat the blood tests in 4 months

## 2020-11-19 ENCOUNTER — APPOINTMENT (OUTPATIENT)
Dept: PULMONOLOGY | Facility: CLINIC | Age: 70
End: 2020-11-19
Payer: MEDICARE

## 2020-11-19 VITALS
HEART RATE: 69 BPM | HEIGHT: 62.5 IN | RESPIRATION RATE: 16 BRPM | TEMPERATURE: 97.7 F | DIASTOLIC BLOOD PRESSURE: 74 MMHG | BODY MASS INDEX: 31.94 KG/M2 | WEIGHT: 178 LBS | SYSTOLIC BLOOD PRESSURE: 145 MMHG | OXYGEN SATURATION: 97 %

## 2020-11-19 DIAGNOSIS — R91.1 SOLITARY PULMONARY NODULE: ICD-10-CM

## 2020-11-19 PROCEDURE — 99204 OFFICE O/P NEW MOD 45 MIN: CPT

## 2020-11-19 RX ORDER — ALBUTEROL SULFATE 90 UG/1
108 (90 BASE) INHALANT RESPIRATORY (INHALATION)
Qty: 1 | Refills: 5 | Status: ACTIVE | COMMUNITY
Start: 2020-11-19 | End: 1900-01-01

## 2020-11-19 RX ORDER — FLUTICASONE PROPIONATE 50 UG/1
50 SPRAY, METERED NASAL DAILY
Qty: 1 | Refills: 3 | Status: ACTIVE | COMMUNITY
Start: 2020-11-19 | End: 1900-01-01

## 2020-11-19 NOTE — ASSESSMENT
[FreeTextEntry1] : 70F former lite smoker, with BONILLA and dry cough, PND, high pretest probability of OUSMANE and swallowing issues.\par \par PLan:\par - start albuterol prn, plan for PFTs\par - Start Flonase nightly\par - home sleep test\par - consider swallow evaluation in future\par - h/o pulm nodule, asked pt to obtain prior imaging or reports

## 2020-11-19 NOTE — HISTORY OF PRESENT ILLNESS
[TextBox_4] : 70F with h/o reactive airway disease and lite smoking in past, here for evaluation of chronic cough and sob. Pt reports BONILLA with exertion such as cleaning her house, has been a gradual decline and pt reports she has been less active and has gained some weight. She reports occasional wheezing. Has wheezed with URIs in the past and used inhalers successfully. She also reports significant postnasal drip. She wakes up at night gasping for air and is a loud snorer. She also noted she sometimes chokes when eating and coughs and has difficulty swallowing. She has a h/o hiatal hernia but denies GERD symptoms.

## 2020-11-19 NOTE — REVIEW OF SYSTEMS
[Nasal Congestion] : nasal congestion [Postnasal Drip] : postnasal drip [Cough] : cough [Wheezing] : wheezing [SOB on Exertion] : sob on exertion [Negative] : Endocrine

## 2020-11-19 NOTE — CONSULT LETTER
[Dear  ___] : Dear  [unfilled], [Consult Letter:] : I had the pleasure of evaluating your patient, [unfilled]. [Please see my note below.] : Please see my note below. [Consult Closing:] : Thank you very much for allowing me to participate in the care of this patient.  If you have any questions, please do not hesitate to contact me. [Sincerely,] : Sincerely, [FreeTextEntry3] : Yaritza Cohen MD, [facility]

## 2020-11-20 ENCOUNTER — APPOINTMENT (OUTPATIENT)
Dept: ORTHOPEDIC SURGERY | Facility: CLINIC | Age: 70
End: 2020-11-20
Payer: MEDICARE

## 2020-11-20 VITALS
BODY MASS INDEX: 31.94 KG/M2 | DIASTOLIC BLOOD PRESSURE: 89 MMHG | SYSTOLIC BLOOD PRESSURE: 149 MMHG | HEIGHT: 62.5 IN | WEIGHT: 178 LBS | HEART RATE: 71 BPM

## 2020-11-20 DIAGNOSIS — M41.9 SCOLIOSIS, UNSPECIFIED: ICD-10-CM

## 2020-11-20 PROCEDURE — 72100 X-RAY EXAM L-S SPINE 2/3 VWS: CPT

## 2020-11-20 PROCEDURE — 72040 X-RAY EXAM NECK SPINE 2-3 VW: CPT

## 2020-11-20 PROCEDURE — 99203 OFFICE O/P NEW LOW 30 MIN: CPT

## 2020-11-20 NOTE — PHYSICAL EXAM
[Ataxic] : not ataxic [de-identified] : Examination of the cervical spine reveals no midline or paraspinal tenderness to palpation. No cervical lymphadenopathy. Decreased range of motion with respect to flexion, extension, rotation, and lateral bending. Negative Spurlings. Negative Lhermitte's. Full range of motion bilateral shoulders without evidence of impingement. No instability of bilateral upper extremities.  Cranial nerves II through XII grossly intact. Intact sensation bilateral upper extremities. 5/5 deltoids biceps triceps wrist extensors wrist flexors finger flexors and hand intrinsics. 1+ biceps triceps and brachioradialis reflexes.  She does appear to have a positive Norris's. 2+ radial pulse. Negative Tinel's over the cubital and carpal tunnel. No skin lesions on the right and left upper extremities.\par \par Examination of the lumbar spine reveals no midline tenderness palpation, step-offs, or skin lesions. Decreased range of motion with respect to flexion, extension, lateral bending, and rotation. No tenderness to palpation of the sciatic notch. No tenderness palpation of the bilateral greater trochanters. No pain with passive internal/external rotation of the hips. No instability of bilateral lower extremities.  Negative ROSALIA. Negative straight leg raise bilaterally. No bowstring. Negative femoral stretch. 5 out of 5 iliopsoas, hip abductors, hips adductors, quadriceps, hamstrings, gastrocsoleus, tibialis anterior, extensor hallucis longus, peroneals. Grossly intact sensation to light touch bilateral lower extremities. 1+ patellar and Achilles reflexes. Downgoing Babinski. No clonus. Intact proprioception. Palpable pulses. No skin lesion and no edema on the right and left lower extremities. [de-identified] : AP lateral cervical x-rays reveal some spondylosis\par \par AP lateral lumbar x-rays with some spondylosis and scoliosis

## 2020-11-20 NOTE — DISCUSSION/SUMMARY
[de-identified] : Given her symptoms have failed to respond to physical therapy medications we will obtain MRIs of her cervical and lumbar spine.  Follow-up afterwards.

## 2020-11-20 NOTE — HISTORY OF PRESENT ILLNESS
[de-identified] : Ms. NOLAN SALGADO  is a 70 year old female who presents with a chronic history of lower neck and low back pain.  Denies any UE/LE radicular symptoms.  Normal bowel and bladder control.   Denies any recent fevers, chills, sweats, weight loss, or infection.  She has tried physical therapy without any relief.  She was told she needs low back surgery. \par \par The patients past medical history, past surgical history, medications, allergies, and social history were reviewed by me today with the patient and documented accordingly.  In addition, the patient's family history, which is noncontributory to their visit, was also reviewed.\par

## 2020-12-03 ENCOUNTER — NON-APPOINTMENT (OUTPATIENT)
Age: 70
End: 2020-12-03

## 2020-12-09 ENCOUNTER — OUTPATIENT (OUTPATIENT)
Dept: OUTPATIENT SERVICES | Facility: HOSPITAL | Age: 70
LOS: 1 days | End: 2020-12-09
Payer: MEDICARE

## 2020-12-09 ENCOUNTER — RESULT REVIEW (OUTPATIENT)
Age: 70
End: 2020-12-09

## 2020-12-09 ENCOUNTER — APPOINTMENT (OUTPATIENT)
Dept: MRI IMAGING | Facility: HOSPITAL | Age: 70
End: 2020-12-09
Payer: MEDICARE

## 2020-12-09 DIAGNOSIS — M54.16 RADICULOPATHY, LUMBAR REGION: ICD-10-CM

## 2020-12-09 PROCEDURE — 72148 MRI LUMBAR SPINE W/O DYE: CPT

## 2020-12-09 PROCEDURE — 72148 MRI LUMBAR SPINE W/O DYE: CPT | Mod: 26

## 2020-12-17 ENCOUNTER — APPOINTMENT (OUTPATIENT)
Dept: PULMONOLOGY | Facility: CLINIC | Age: 70
End: 2020-12-17

## 2020-12-19 ENCOUNTER — APPOINTMENT (OUTPATIENT)
Dept: DISASTER EMERGENCY | Facility: CLINIC | Age: 70
End: 2020-12-19

## 2020-12-22 ENCOUNTER — APPOINTMENT (OUTPATIENT)
Dept: PULMONOLOGY | Facility: CLINIC | Age: 70
End: 2020-12-22

## 2021-01-09 ENCOUNTER — APPOINTMENT (OUTPATIENT)
Dept: DISASTER EMERGENCY | Facility: CLINIC | Age: 71
End: 2021-01-09

## 2021-01-11 ENCOUNTER — APPOINTMENT (OUTPATIENT)
Dept: ENDOCRINOLOGY | Facility: CLINIC | Age: 71
End: 2021-01-11
Payer: MEDICARE

## 2021-01-11 VITALS
TEMPERATURE: 97.1 F | DIASTOLIC BLOOD PRESSURE: 76 MMHG | HEART RATE: 77 BPM | WEIGHT: 183 LBS | OXYGEN SATURATION: 96 % | RESPIRATION RATE: 17 BRPM | HEIGHT: 62.5 IN | BODY MASS INDEX: 32.83 KG/M2 | SYSTOLIC BLOOD PRESSURE: 135 MMHG

## 2021-01-11 LAB — SARS-COV-2 N GENE NPH QL NAA+PROBE: NOT DETECTED

## 2021-01-11 PROCEDURE — 99214 OFFICE O/P EST MOD 30 MIN: CPT

## 2021-01-11 PROCEDURE — 99072 ADDL SUPL MATRL&STAF TM PHE: CPT

## 2021-01-11 NOTE — HISTORY OF PRESENT ILLNESS
[FreeTextEntry1] : Doing well but she has gaining weight. The hair on her face is the same the androgens have improved. She has prediabetes. Her systolic blood pressure is upper normal. Denies palpitation, heat intolerance or increased perspiration. Denies any headaches or dizziness.

## 2021-01-11 NOTE — ASSESSMENT
[FreeTextEntry1] : The androgens are improved\par The hirsutism is the same \par Her systolic BP is slightly elevated\par Will raise the the Aldactone 25 mg 2 tablets po AM plus 2 tablets po PM.\par Will repeat the blood test before next visit\par Will continue observation of the pituitary microadenoma

## 2021-01-11 NOTE — DATA REVIEWED
[FreeTextEntry1] : The free testosterone and DHEA-S are improved. The FBS is normal but the HbA1c is slightly elevated. The MRI of the brain revealed a 1-2 mm pituitary microadenoma.

## 2021-01-12 ENCOUNTER — APPOINTMENT (OUTPATIENT)
Dept: PULMONOLOGY | Facility: CLINIC | Age: 71
End: 2021-01-12
Payer: MEDICARE

## 2021-01-12 VITALS
HEIGHT: 62.5 IN | OXYGEN SATURATION: 94 % | DIASTOLIC BLOOD PRESSURE: 80 MMHG | HEART RATE: 86 BPM | BODY MASS INDEX: 32.83 KG/M2 | SYSTOLIC BLOOD PRESSURE: 149 MMHG | WEIGHT: 183 LBS

## 2021-01-12 DIAGNOSIS — G47.19 OTHER HYPERSOMNIA: ICD-10-CM

## 2021-01-12 PROCEDURE — 94729 DIFFUSING CAPACITY: CPT

## 2021-01-12 PROCEDURE — ZZZZZ: CPT

## 2021-01-12 PROCEDURE — 99072 ADDL SUPL MATRL&STAF TM PHE: CPT

## 2021-01-12 PROCEDURE — 99214 OFFICE O/P EST MOD 30 MIN: CPT | Mod: 25

## 2021-01-12 PROCEDURE — 94010 BREATHING CAPACITY TEST: CPT

## 2021-01-12 PROCEDURE — 94726 PLETHYSMOGRAPHY LUNG VOLUMES: CPT

## 2021-01-13 PROBLEM — G47.19 EXCESSIVE DAYTIME SLEEPINESS: Status: ACTIVE | Noted: 2020-11-19

## 2021-01-13 NOTE — ASSESSMENT
[FreeTextEntry1] : 70F former lite smoker, with BONILLA and dry cough, PND, high pretest probability of OUSMANE. BONILLA likely due to deconditioning and weight gain. Need to optimize BP control, has grade 2 diastolic dysfunction and this could be a factor in BONILLA. No evidence of lung disease based on PFTs and imaging.\par \par PLan:\par \par - home sleep test\par - h/o pulm nodules too small to follow per guidelines

## 2021-01-13 NOTE — CONSULT LETTER
[Dear  ___] : Dear  [unfilled], [Consult Letter:] : I had the pleasure of evaluating your patient, [unfilled]. [Please see my note below.] : Please see my note below. [Consult Closing:] : Thank you very much for allowing me to participate in the care of this patient.  If you have any questions, please do not hesitate to contact me. [Sincerely,] : Sincerely, [FreeTextEntry3] : Yaritza Cohen MD, [facility]

## 2021-01-13 NOTE — HISTORY OF PRESENT ILLNESS
[TextBox_4] : 1/12/2021: Pt feels same, BONILLA overall unchanged. minimal cough, no chest pain, no wheezing.\par \par \par 70F with h/o reactive airway disease and lite smoking in past, here for evaluation of chronic cough and sob. Pt reports BONILLA with exertion such as cleaning her house, has been a gradual decline and pt reports she has been less active and has gained some weight. She reports occasional wheezing. Has wheezed with URIs in the past and used inhalers successfully. She also reports significant postnasal drip. She wakes up at night gasping for air and is a loud snorer. She also noted she sometimes chokes when eating and coughs and has difficulty swallowing. She has a h/o hiatal hernia but denies GERD symptoms.

## 2021-01-15 ENCOUNTER — APPOINTMENT (OUTPATIENT)
Dept: ORTHOPEDIC SURGERY | Facility: CLINIC | Age: 71
End: 2021-01-15
Payer: MEDICARE

## 2021-01-15 DIAGNOSIS — M48.07 SPINAL STENOSIS, LUMBOSACRAL REGION: ICD-10-CM

## 2021-01-15 PROCEDURE — 99072 ADDL SUPL MATRL&STAF TM PHE: CPT

## 2021-01-15 PROCEDURE — 99214 OFFICE O/P EST MOD 30 MIN: CPT

## 2021-01-15 NOTE — PHYSICAL EXAM
[Ataxic] : not ataxic [de-identified] : Examination of the cervical spine reveals no midline or paraspinal tenderness to palpation. No cervical lymphadenopathy. Decreased range of motion with respect to flexion, extension, rotation, and lateral bending. Negative Spurlings. Negative Lhermitte's. Full range of motion bilateral shoulders without evidence of impingement. No instability of bilateral upper extremities.  Cranial nerves II through XII grossly intact. Intact sensation bilateral upper extremities. 5/5 deltoids biceps triceps wrist extensors wrist flexors finger flexors and hand intrinsics. 1+ biceps triceps and brachioradialis reflexes.  She does appear to have a positive Norris's. 2+ radial pulse. Negative Tinel's over the cubital and carpal tunnel. No skin lesions on the right and left upper extremities.\par \par Examination of the lumbar spine reveals no midline tenderness palpation, step-offs, or skin lesions. Decreased range of motion with respect to flexion, extension, lateral bending, and rotation. No tenderness to palpation of the sciatic notch. No tenderness palpation of the bilateral greater trochanters. No pain with passive internal/external rotation of the hips. No instability of bilateral lower extremities.  Negative ROSALIA. Negative straight leg raise bilaterally. No bowstring. Negative femoral stretch. 5 out of 5 iliopsoas, hip abductors, hips adductors, quadriceps, hamstrings, gastrocsoleus, tibialis anterior, extensor hallucis longus, peroneals. Grossly intact sensation to light touch bilateral lower extremities. 1+ patellar and Achilles reflexes. Downgoing Babinski. No clonus. Intact proprioception. Palpable pulses. No skin lesion and no edema on the right and left lower extremities. [de-identified] : AP lateral cervical x-rays reveal some spondylosis\par \par AP lateral lumbar x-rays with some spondylosis and scoliosis\par \par Lumbar spine MRI reveals some degenerative changes and mild stenosis without high-grade neurocompression

## 2021-01-15 NOTE — HISTORY OF PRESENT ILLNESS
[de-identified] : Ms. NOLAN SALGADO  is a 70 year old female who presents to the office for a follow-up visit.  She is here to review her lumbar MRI.\par

## 2021-01-15 NOTE — DISCUSSION/SUMMARY
[de-identified] : We discussed further treatment options both nonsurgical and surgical. At this point she wishes to continue with nonsurgical treatment. She will try some additional physical therapy. We are also waiting upon an MRI of her cervical spine. She will follow-up after her MRI of her cervical spine.

## 2021-01-21 ENCOUNTER — APPOINTMENT (OUTPATIENT)
Dept: MRI IMAGING | Facility: HOSPITAL | Age: 71
End: 2021-01-21
Payer: MEDICARE

## 2021-01-21 ENCOUNTER — RESULT REVIEW (OUTPATIENT)
Age: 71
End: 2021-01-21

## 2021-01-21 ENCOUNTER — OUTPATIENT (OUTPATIENT)
Dept: OUTPATIENT SERVICES | Facility: HOSPITAL | Age: 71
LOS: 1 days | End: 2021-01-21
Payer: MEDICARE

## 2021-01-21 DIAGNOSIS — M54.12 RADICULOPATHY, CERVICAL REGION: ICD-10-CM

## 2021-01-21 PROCEDURE — 72141 MRI NECK SPINE W/O DYE: CPT | Mod: 26

## 2021-01-21 PROCEDURE — 72141 MRI NECK SPINE W/O DYE: CPT

## 2021-02-06 ENCOUNTER — RX RENEWAL (OUTPATIENT)
Age: 71
End: 2021-02-06

## 2021-02-09 ENCOUNTER — APPOINTMENT (OUTPATIENT)
Dept: INTERNAL MEDICINE | Facility: CLINIC | Age: 71
End: 2021-02-09

## 2021-02-19 ENCOUNTER — APPOINTMENT (OUTPATIENT)
Dept: ORTHOPEDIC SURGERY | Facility: CLINIC | Age: 71
End: 2021-02-19

## 2021-02-22 ENCOUNTER — APPOINTMENT (OUTPATIENT)
Dept: CARDIOLOGY | Facility: CLINIC | Age: 71
End: 2021-02-22
Payer: MEDICARE

## 2021-02-22 VITALS
DIASTOLIC BLOOD PRESSURE: 66 MMHG | TEMPERATURE: 97.8 F | SYSTOLIC BLOOD PRESSURE: 129 MMHG | HEIGHT: 62.5 IN | BODY MASS INDEX: 32.83 KG/M2 | RESPIRATION RATE: 16 BRPM | HEART RATE: 71 BPM | WEIGHT: 183 LBS | OXYGEN SATURATION: 95 %

## 2021-02-22 VITALS — WEIGHT: 176 LBS | BODY MASS INDEX: 30.05 KG/M2 | HEIGHT: 64 IN

## 2021-02-22 PROCEDURE — 93000 ELECTROCARDIOGRAM COMPLETE: CPT

## 2021-02-22 PROCEDURE — 99214 OFFICE O/P EST MOD 30 MIN: CPT

## 2021-02-22 PROCEDURE — 99072 ADDL SUPL MATRL&STAF TM PHE: CPT

## 2021-02-24 NOTE — REASON FOR VISIT
[FreeTextEntry1] : Dear Dr. Arguello:\par \par I had the pleasure of re-evaluating your patient, Ms. Miriam Calderon, who presents today focardiovascular evaluation.  As you know, she is a a 68 year old woman with history of hypertension, hypercholesterolemia, reactive airway disease, who now presents with complaints of having intermittent substernal chest pressure not necessarily related to exertion.  She states symptoms started recently.  She has several CAD risk factors and was concerned enough with her recent symptoms that she wanted a cardiovascular evaluation.  She has not had an evaluation in some time.\par \par 12-lead ECG demonstrates SR.  Physical examination was unremarkable.\par \par At this time, we will recommend:\par 1. Echocardiogram to evaluate cardiac structure/function.\par 2. Nuclear stress test to assess for ischemia, as she has several CAD risk factors.\par 3. She is currently on a low dose daily aspirin.  Not on a statin.\par 4. Asked that she bring her recent lab results including lipid panel for my review.\par \par Michael, thank you again for entrusting her care with me.\par \par Warmest regards,\par Huber Liu\par \par \par

## 2021-02-24 NOTE — PHYSICAL EXAM
[General Appearance - Well Developed] : well developed [Normal Appearance] : normal appearance [Well Groomed] : well groomed [General Appearance - Well Nourished] : well nourished [No Deformities] : no deformities [General Appearance - In No Acute Distress] : no acute distress [Normal Conjunctiva] : the conjunctiva exhibited no abnormalities [Eyelids - No Xanthelasma] : the eyelids demonstrated no xanthelasmas [Normal Oral Mucosa] : normal oral mucosa [No Oral Pallor] : no oral pallor [No Oral Cyanosis] : no oral cyanosis [Normal Jugular Venous A Waves Present] : normal jugular venous A waves present [Normal Jugular Venous V Waves Present] : normal jugular venous V waves present [No Jugular Venous Tan A Waves] : no jugular venous tan A waves [Respiration, Rhythm And Depth] : normal respiratory rhythm and effort [Exaggerated Use Of Accessory Muscles For Inspiration] : no accessory muscle use [Auscultation Breath Sounds / Voice Sounds] : lungs were clear to auscultation bilaterally [Heart Rate And Rhythm] : heart rate and rhythm were normal [Heart Sounds] : normal S1 and S2 [Murmurs] : no murmurs present [Abdomen Soft] : soft [Abdomen Tenderness] : non-tender [Abdomen Mass (___ Cm)] : no abdominal mass palpated [Abnormal Walk] : normal gait [Gait - Sufficient For Exercise Testing] : the gait was sufficient for exercise testing [Nail Clubbing] : no clubbing of the fingernails [Cyanosis, Localized] : no localized cyanosis [Petechial Hemorrhages (___cm)] : no petechial hemorrhages [Skin Color & Pigmentation] : normal skin color and pigmentation [] : no rash [No Venous Stasis] : no venous stasis [Skin Lesions] : no skin lesions [No Skin Ulcers] : no skin ulcer [No Xanthoma] : no  xanthoma was observed [Oriented To Time, Place, And Person] : oriented to person, place, and time [Affect] : the affect was normal [Mood] : the mood was normal [No Anxiety] : not feeling anxious

## 2021-02-24 NOTE — REVIEW OF SYSTEMS
[Dyspnea on exertion] : dyspnea during exertion [Shortness Of Breath] : shortness of breath [Chest  Pressure] : chest pressure [Chest Pain] : chest pain [Lower Ext Edema] : no extremity edema [Leg Claudication] : no intermittent leg claudication [Palpitations] : no palpitations [Negative] : Heme/Lymph

## 2021-05-11 ENCOUNTER — APPOINTMENT (OUTPATIENT)
Dept: INTERNAL MEDICINE | Facility: CLINIC | Age: 71
End: 2021-05-11
Payer: MEDICARE

## 2021-05-11 ENCOUNTER — APPOINTMENT (OUTPATIENT)
Dept: ENDOCRINOLOGY | Facility: CLINIC | Age: 71
End: 2021-05-11
Payer: MEDICARE

## 2021-05-11 VITALS
HEIGHT: 64 IN | WEIGHT: 177 LBS | RESPIRATION RATE: 16 BRPM | OXYGEN SATURATION: 97 % | SYSTOLIC BLOOD PRESSURE: 129 MMHG | DIASTOLIC BLOOD PRESSURE: 73 MMHG | HEART RATE: 70 BPM | BODY MASS INDEX: 30.22 KG/M2

## 2021-05-11 VITALS
WEIGHT: 177 LBS | SYSTOLIC BLOOD PRESSURE: 129 MMHG | HEIGHT: 64 IN | OXYGEN SATURATION: 97 % | TEMPERATURE: 96.9 F | DIASTOLIC BLOOD PRESSURE: 73 MMHG | BODY MASS INDEX: 30.22 KG/M2 | RESPIRATION RATE: 16 BRPM | HEART RATE: 70 BPM

## 2021-05-11 DIAGNOSIS — Z13.820 ENCOUNTER FOR SCREENING FOR OSTEOPOROSIS: ICD-10-CM

## 2021-05-11 PROCEDURE — 99072 ADDL SUPL MATRL&STAF TM PHE: CPT

## 2021-05-11 PROCEDURE — 99214 OFFICE O/P EST MOD 30 MIN: CPT

## 2021-05-11 NOTE — DATA REVIEWED
[FreeTextEntry1] : The testosterone and DHEA-S remain elevated. The HbA1c was similar. The pituitary tumor has been stable

## 2021-05-11 NOTE — HISTORY OF PRESENT ILLNESS
[FreeTextEntry1] : Patient feels better, she does not feel nervous or anxious now. No tremor of the hands. She denies palpitations, heat intolerance or increased perspiration. No neck pain or difficulty swallowing. Her weight is unchanged. The thyroid test are normal. She has been taking only 2 spironolactone daily. The US thyroid was unchanged. The hirsutism still present.\par

## 2021-05-11 NOTE — ASSESSMENT
[FreeTextEntry1] : The hirsutism persists\par The testosterone and DHEA-S were elevated\par Advised to take Aldactone 25 mg  2 tablets po AM plus 1 tablet po PM\par Will repeat the blood test before next visit\par Will continue observation of the pituitary tumor

## 2021-05-11 NOTE — PHYSICAL EXAM
[Alert] : alert [No Acute Distress] : no acute distress [Normal Outer Ear/Nose] : the ears and nose were normal in appearance [Normal TMs] : both tympanic membranes were normal [No Neck Mass] : no neck mass was observed [Thyroid Not Enlarged] : the thyroid was not enlarged [No Respiratory Distress] : no respiratory distress [Clear to Auscultation] : lungs were clear to auscultation bilaterally

## 2021-05-11 NOTE — REASON FOR VISIT
[Follow - Up] : a follow-up visit [Hyperthyroidism] : hyperthyroidism [Pituitary Evaluation/ Disorder] : pituitary evaluation/disorder [PCOS] : PCOS [Other___] : [unfilled]

## 2021-05-14 ENCOUNTER — RX RENEWAL (OUTPATIENT)
Age: 71
End: 2021-05-14

## 2021-05-21 ENCOUNTER — APPOINTMENT (OUTPATIENT)
Dept: ORTHOPEDIC SURGERY | Facility: CLINIC | Age: 71
End: 2021-05-21
Payer: MEDICARE

## 2021-05-21 VITALS — TEMPERATURE: 97 F

## 2021-05-21 DIAGNOSIS — M48.02 SPINAL STENOSIS, CERVICAL REGION: ICD-10-CM

## 2021-05-21 PROCEDURE — 99214 OFFICE O/P EST MOD 30 MIN: CPT

## 2021-05-21 PROCEDURE — 99072 ADDL SUPL MATRL&STAF TM PHE: CPT

## 2021-05-21 NOTE — DISCUSSION/SUMMARY
[de-identified] : We discussed further treatment options. We reviewed her MRI. She wishes to be evaluated for possible injection based therapies. She will be referred for this. She will let me know of any changes or worsening of her symptoms.

## 2021-05-21 NOTE — HISTORY OF PRESENT ILLNESS
[de-identified] : Ms. NOLAN SALGADO  is a 70 year old female who presents to the office for a follow-up visit.  She is here to review her cervical MRI. \par

## 2021-05-21 NOTE — PHYSICAL EXAM
[Ataxic] : not ataxic [de-identified] : Examination of the cervical spine reveals no midline or paraspinal tenderness to palpation. No cervical lymphadenopathy. Decreased range of motion with respect to flexion, extension, rotation, and lateral bending. Negative Spurlings. Negative Lhermitte's. Full range of motion bilateral shoulders without evidence of impingement. No instability of bilateral upper extremities.  Cranial nerves II through XII grossly intact. Intact sensation bilateral upper extremities. 5/5 deltoids biceps triceps wrist extensors wrist flexors finger flexors and hand intrinsics. 1+ biceps triceps and brachioradialis reflexes.  She does appear to have a positive Norris's. 2+ radial pulse. Negative Tinel's over the cubital and carpal tunnel. No skin lesions on the right and left upper extremities.\par \par Examination of the lumbar spine reveals no midline tenderness palpation, step-offs, or skin lesions. Decreased range of motion with respect to flexion, extension, lateral bending, and rotation. No tenderness to palpation of the sciatic notch. No tenderness palpation of the bilateral greater trochanters. No pain with passive internal/external rotation of the hips. No instability of bilateral lower extremities.  Negative ROSALIA. Negative straight leg raise bilaterally. No bowstring. Negative femoral stretch. 5 out of 5 iliopsoas, hip abductors, hips adductors, quadriceps, hamstrings, gastrocsoleus, tibialis anterior, extensor hallucis longus, peroneals. Grossly intact sensation to light touch bilateral lower extremities. 1+ patellar and Achilles reflexes. Downgoing Babinski. No clonus. Intact proprioception. Palpable pulses. No skin lesion and no edema on the right and left lower extremities. [de-identified] : AP lateral cervical x-rays reveal some spondylosis\par \par AP lateral lumbar x-rays with some spondylosis and scoliosis\par \par Lumbar spine MRI reveals some degenerative changes and mild stenosis without high-grade neurocompression\par \par Cervical MRI does reveal some stenosis at C5-6 without cord compression or spinal cord signal abnormality.

## 2021-06-16 ENCOUNTER — TRANSCRIPTION ENCOUNTER (OUTPATIENT)
Age: 71
End: 2021-06-16

## 2021-07-02 ENCOUNTER — TRANSCRIPTION ENCOUNTER (OUTPATIENT)
Age: 71
End: 2021-07-02

## 2021-08-24 ENCOUNTER — APPOINTMENT (OUTPATIENT)
Dept: ENDOCRINOLOGY | Facility: CLINIC | Age: 71
End: 2021-08-24
Payer: MEDICARE

## 2021-08-24 VITALS
OXYGEN SATURATION: 97 % | RESPIRATION RATE: 16 BRPM | HEIGHT: 64 IN | BODY MASS INDEX: 31.24 KG/M2 | WEIGHT: 183 LBS | TEMPERATURE: 97.8 F | DIASTOLIC BLOOD PRESSURE: 59 MMHG | HEART RATE: 65 BPM | SYSTOLIC BLOOD PRESSURE: 102 MMHG

## 2021-08-24 PROCEDURE — 99214 OFFICE O/P EST MOD 30 MIN: CPT

## 2021-08-24 NOTE — HISTORY OF PRESENT ILLNESS
[FreeTextEntry1] : Doing well asymptomatic, she was on vacation, and she has gained weight. The patient is being followed by a pulmonary doctor. She has not been following the diet or exercising. Her BP is slightly low. She has been taking 2 spironolactone pills daily

## 2021-08-24 NOTE — REASON FOR VISIT
[Follow - Up] : a follow-up visit [Pituitary Evaluation/ Disorder] : pituitary evaluation/disorder [PCOS] : PCOS [Other___] : [unfilled]

## 2021-08-24 NOTE — DATA REVIEWED
[FreeTextEntry1] : The FBS was normal with an slightly elevated HbA1c. The free testosterone is borderline elevated, as well as the DHEA-S. The TSH and free t4 are normal.

## 2021-09-07 ENCOUNTER — TRANSCRIPTION ENCOUNTER (OUTPATIENT)
Age: 71
End: 2021-09-07

## 2021-09-09 ENCOUNTER — NON-APPOINTMENT (OUTPATIENT)
Age: 71
End: 2021-09-09

## 2021-10-15 ENCOUNTER — NON-APPOINTMENT (OUTPATIENT)
Age: 71
End: 2021-10-15

## 2021-11-07 NOTE — HISTORY OF PRESENT ILLNESS
[de-identified] : 70 year old  female patient with history of stable Hypertension, Hypercholesterolemia, Reactive Airway Disease, Bipolar Disorder, Lumbar Radiculopathy, Cervical Radiculopathy, history as stated, presented for follow up examination. Patient is compliant with all medications. Denies shortness of breath, chest pain or abdominal pains at this time. ROS as stated.\par

## 2021-11-07 NOTE — HEALTH RISK ASSESSMENT
[No] : In the past 12 months have you used drugs other than those required for medical reasons? No [No falls in past year] : Patient reported no falls in the past year [0] : 2) Feeling down, depressed, or hopeless: Not at all (0) [] : No [de-identified] : None [[id number]] : 0

## 2021-11-07 NOTE — ASSESSMENT
[FreeTextEntry1] : 70 year old female found to have stable Hypertension, Hypercholesterolemia, Reactive Airway Disease, Bipolar Disorder, Lumbar Radiculopathy, Cervical Radiculopathy,with the current prescription regimen as recommended, diet and life style modifications, as counseled. Prior results reviewed, interpreted and discussed with the patient during today's examination, as appropriate. Follow up, treatment plan and tests, as ordered.\par \par Total time spent :30 minutes\par Including:\par Preparation prior to visit - Reviewing prior record, results of tests and Consultation Reports as applicable\par Conducting an appropriate H & P during today's encounter\par Appropriate orders for tests, medications and procedures, as applicable\par Counseling patient \par Note completion\par

## 2021-11-09 ENCOUNTER — NON-APPOINTMENT (OUTPATIENT)
Age: 71
End: 2021-11-09

## 2021-11-09 ENCOUNTER — APPOINTMENT (OUTPATIENT)
Dept: INTERNAL MEDICINE | Facility: CLINIC | Age: 71
End: 2021-11-09
Payer: MEDICARE

## 2021-11-09 VITALS
HEIGHT: 64 IN | OXYGEN SATURATION: 97 % | BODY MASS INDEX: 30.73 KG/M2 | TEMPERATURE: 98 F | HEART RATE: 64 BPM | WEIGHT: 180 LBS | DIASTOLIC BLOOD PRESSURE: 80 MMHG | SYSTOLIC BLOOD PRESSURE: 153 MMHG | RESPIRATION RATE: 16 BRPM

## 2021-11-09 DIAGNOSIS — F31.9 BIPOLAR DISORDER, UNSPECIFIED: ICD-10-CM

## 2021-11-09 PROCEDURE — 99214 OFFICE O/P EST MOD 30 MIN: CPT | Mod: 25

## 2021-11-09 PROCEDURE — 93000 ELECTROCARDIOGRAM COMPLETE: CPT

## 2021-11-09 NOTE — HISTORY OF PRESENT ILLNESS
[de-identified] : 71 year old  female patient with history of stable Hypertension, Hypercholesterolemia, Reactive Airway Disease, Bipolar Disorder, Lumbar Radiculopathy, Cervical Radiculopathy, Elevated Hemoglobin A1c, history as stated, presented for follow up examination. Patient is compliant with all medications. Denies shortness of breath, chest pain or abdominal pains at this time. ROS as stated.\par

## 2021-11-09 NOTE — ASSESSMENT
[FreeTextEntry1] : 71 year old female found to have stable Hypertension, Hypercholesterolemia, Reactive Airway Disease, Bipolar Disorder, Lumbar Radiculopathy, Cervical Radiculopathy, Elevated Hemoglobin A1c, with the current prescription regimen as recommended, diet and life style modifications, as counseled. Prior results reviewed, interpreted and discussed with the patient during today's examination, as appropriate. Follow up, treatment plan and tests, as ordered.\par \par EKG showed NSR at the rate of 60 BPM, LAD, non-sp ST-T changes noted.\par \par Total time spent : 30 minutes\par Including:\par Preparation prior to visit - Reviewing prior record, results of tests and Consultation Reports as applicable\par Conducting an appropriate H & P during today's encounter\par Appropriate orders for tests, medications and procedures, as applicable\par Counseling patient \par Note completion\par

## 2021-11-09 NOTE — HEALTH RISK ASSESSMENT
[No] : In the past 12 months have you used drugs other than those required for medical reasons? No [No falls in past year] : Patient reported no falls in the past year [0] : 2) Feeling down, depressed, or hopeless: Not at all (0) [] : No [de-identified] : ENDO [[id number]] : 0

## 2022-01-03 ENCOUNTER — APPOINTMENT (OUTPATIENT)
Dept: CARDIOLOGY | Facility: CLINIC | Age: 72
End: 2022-01-03
Payer: COMMERCIAL

## 2022-01-03 ENCOUNTER — APPOINTMENT (OUTPATIENT)
Dept: ENDOCRINOLOGY | Facility: CLINIC | Age: 72
End: 2022-01-03
Payer: COMMERCIAL

## 2022-01-03 ENCOUNTER — NON-APPOINTMENT (OUTPATIENT)
Age: 72
End: 2022-01-03

## 2022-01-03 VITALS
HEIGHT: 64 IN | WEIGHT: 180 LBS | DIASTOLIC BLOOD PRESSURE: 69 MMHG | BODY MASS INDEX: 30.73 KG/M2 | HEART RATE: 62 BPM | RESPIRATION RATE: 16 BRPM | TEMPERATURE: 98 F | OXYGEN SATURATION: 97 % | SYSTOLIC BLOOD PRESSURE: 121 MMHG

## 2022-01-03 DIAGNOSIS — L68.0 HIRSUTISM: ICD-10-CM

## 2022-01-03 DIAGNOSIS — D49.7 NEOPLASM OF UNSPECIFIED BEHAVIOR OF ENDOCRINE GLANDS AND OTHER PARTS OF NERVOUS SYSTEM: ICD-10-CM

## 2022-01-03 DIAGNOSIS — R73.03 PREDIABETES.: ICD-10-CM

## 2022-01-03 DIAGNOSIS — E28.2 POLYCYSTIC OVARIAN SYNDROME: ICD-10-CM

## 2022-01-03 DIAGNOSIS — E05.00 THYROTOXICOSIS WITH DIFFUSE GOITER W/OUT THYROTOXIC CRISIS OR STORM: ICD-10-CM

## 2022-01-03 PROCEDURE — 99214 OFFICE O/P EST MOD 30 MIN: CPT

## 2022-01-03 PROCEDURE — 93000 ELECTROCARDIOGRAM COMPLETE: CPT

## 2022-01-03 NOTE — ASSESSMENT
[FreeTextEntry1] : Patient is doing well, asymptomatic\par She is clinically euthyroid\par The free testosterone and DHEA-S have improved have improved\par Will raise the Spironolactone dose to 25 mg po BID\par Will repeat the MRI of the pituitary gland and US thyroid

## 2022-01-03 NOTE — DATA REVIEWED
[FreeTextEntry1] : The FBS was normal with a HbA1c 6%, similar than the previous visit. The Free and total testosterone were normal and the DHEA-S has improved. Her prolactin was normal. The LDL-C was elevated. The TSH and Free t4 were normal.

## 2022-01-03 NOTE — HISTORY OF PRESENT ILLNESS
[FreeTextEntry1] : Patient feels fine, she does not feel nervous or anxious now. No tremor of the hands. She has sporadic palpitations, no heat intolerance or increased perspiration. No neck pain or difficulty swallowing. Her weight is unchanged. The thyroid test are normal. The last US thyroid 5/21 revealed small nodules. The hirsutism is stable. No galactorrhea. No headaches or dizziness\par \par

## 2022-01-04 PROBLEM — R73.03 PREDIABETES: Status: ACTIVE | Noted: 2021-08-24

## 2022-02-17 ENCOUNTER — NON-APPOINTMENT (OUTPATIENT)
Age: 72
End: 2022-02-17

## 2022-02-17 ENCOUNTER — TRANSCRIPTION ENCOUNTER (OUTPATIENT)
Age: 72
End: 2022-02-17

## 2022-05-10 ENCOUNTER — APPOINTMENT (OUTPATIENT)
Dept: INTERNAL MEDICINE | Facility: CLINIC | Age: 72
End: 2022-05-10

## 2022-06-06 ENCOUNTER — NON-APPOINTMENT (OUTPATIENT)
Age: 72
End: 2022-06-06

## 2022-06-06 ENCOUNTER — APPOINTMENT (OUTPATIENT)
Dept: CARDIOLOGY | Facility: CLINIC | Age: 72
End: 2022-06-06
Payer: MEDICARE

## 2022-06-06 VITALS
WEIGHT: 178 LBS | HEART RATE: 61 BPM | BODY MASS INDEX: 30.39 KG/M2 | HEIGHT: 64 IN | TEMPERATURE: 97.6 F | SYSTOLIC BLOOD PRESSURE: 122 MMHG | RESPIRATION RATE: 16 BRPM | DIASTOLIC BLOOD PRESSURE: 73 MMHG | OXYGEN SATURATION: 96 %

## 2022-06-06 DIAGNOSIS — I35.1 NONRHEUMATIC AORTIC (VALVE) INSUFFICIENCY: ICD-10-CM

## 2022-06-06 DIAGNOSIS — R01.1 CARDIAC MURMUR, UNSPECIFIED: ICD-10-CM

## 2022-06-06 DIAGNOSIS — E78.00 PURE HYPERCHOLESTEROLEMIA, UNSPECIFIED: ICD-10-CM

## 2022-06-06 PROCEDURE — 99214 OFFICE O/P EST MOD 30 MIN: CPT

## 2022-06-06 PROCEDURE — 93000 ELECTROCARDIOGRAM COMPLETE: CPT

## 2022-06-12 PROBLEM — E78.00 HYPERCHOLESTEROLEMIA: Status: ACTIVE | Noted: 2018-08-20

## 2022-06-14 ENCOUNTER — NON-APPOINTMENT (OUTPATIENT)
Age: 72
End: 2022-06-14

## 2022-06-15 ENCOUNTER — NON-APPOINTMENT (OUTPATIENT)
Age: 72
End: 2022-06-15

## 2022-06-15 ENCOUNTER — LABORATORY RESULT (OUTPATIENT)
Age: 72
End: 2022-06-15

## 2022-06-16 ENCOUNTER — APPOINTMENT (OUTPATIENT)
Dept: INTERNAL MEDICINE | Facility: CLINIC | Age: 72
End: 2022-06-16
Payer: MEDICARE

## 2022-06-16 VITALS
HEART RATE: 67 BPM | TEMPERATURE: 97 F | RESPIRATION RATE: 16 BRPM | SYSTOLIC BLOOD PRESSURE: 122 MMHG | DIASTOLIC BLOOD PRESSURE: 76 MMHG | WEIGHT: 178 LBS | HEIGHT: 64 IN | OXYGEN SATURATION: 98 % | BODY MASS INDEX: 30.39 KG/M2

## 2022-06-16 DIAGNOSIS — J45.909 UNSPECIFIED ASTHMA, UNCOMPLICATED: ICD-10-CM

## 2022-06-16 DIAGNOSIS — I10 ESSENTIAL (PRIMARY) HYPERTENSION: ICD-10-CM

## 2022-06-16 DIAGNOSIS — R73.09 OTHER ABNORMAL GLUCOSE: ICD-10-CM

## 2022-06-16 DIAGNOSIS — M54.12 RADICULOPATHY, CERVICAL REGION: ICD-10-CM

## 2022-06-16 DIAGNOSIS — Z01.818 ENCOUNTER FOR OTHER PREPROCEDURAL EXAMINATION: ICD-10-CM

## 2022-06-16 DIAGNOSIS — M54.16 RADICULOPATHY, LUMBAR REGION: ICD-10-CM

## 2022-06-16 DIAGNOSIS — S69.92XA UNSPECIFIED INJURY OF LEFT WRIST, HAND AND FINGER(S), INITIAL ENCOUNTER: ICD-10-CM

## 2022-06-16 LAB
25(OH)D3 SERPL-MCNC: 48.7 NG/ML
ABO + RH PNL BLD: NORMAL
ALBUMIN SERPL ELPH-MCNC: 4.2 G/DL
ALP BLD-CCNC: 64 U/L
ALT SERPL-CCNC: 28 U/L
ANION GAP SERPL CALC-SCNC: 17 MMOL/L
APPEARANCE: CLEAR
APTT BLD: 27.2 SEC
AST SERPL-CCNC: 31 U/L
BASOPHILS # BLD AUTO: 0.03 K/UL
BASOPHILS NFR BLD AUTO: 0.7 %
BILIRUB SERPL-MCNC: 0.2 MG/DL
BILIRUBIN URINE: NEGATIVE
BLOOD URINE: NEGATIVE
BUN SERPL-MCNC: 22 MG/DL
CALCIUM SERPL-MCNC: 9.4 MG/DL
CHLORIDE SERPL-SCNC: 99 MMOL/L
CHOLEST SERPL-MCNC: 199 MG/DL
CO2 SERPL-SCNC: 22 MMOL/L
COLOR: NORMAL
CREAT SERPL-MCNC: 0.81 MG/DL
EGFR: 78 ML/MIN/1.73M2
EOSINOPHIL # BLD AUTO: 0.12 K/UL
EOSINOPHIL NFR BLD AUTO: 2.7 %
ESTIMATED AVERAGE GLUCOSE: 114 MG/DL
GGT SERPL-CCNC: 36 U/L
GLUCOSE QUALITATIVE U: NEGATIVE
GLUCOSE SERPL-MCNC: 126 MG/DL
HBA1C MFR BLD HPLC: 5.6 %
HCT VFR BLD CALC: 39.6 %
HDLC SERPL-MCNC: 46 MG/DL
HGB BLD-MCNC: 12.8 G/DL
IMM GRANULOCYTES NFR BLD AUTO: 0.2 %
INR PPP: 1.1 RATIO
KETONES URINE: NORMAL
LDLC SERPL CALC-MCNC: 110 MG/DL
LEUKOCYTE ESTERASE URINE: NEGATIVE
LYMPHOCYTES # BLD AUTO: 2.1 K/UL
LYMPHOCYTES NFR BLD AUTO: 47 %
MAN DIFF?: NORMAL
MCHC RBC-ENTMCNC: 27.4 PG
MCHC RBC-ENTMCNC: 32.3 GM/DL
MCV RBC AUTO: 84.8 FL
MONOCYTES # BLD AUTO: 0.47 K/UL
MONOCYTES NFR BLD AUTO: 10.5 %
NEUTROPHILS # BLD AUTO: 1.74 K/UL
NEUTROPHILS NFR BLD AUTO: 38.9 %
NITRITE URINE: NEGATIVE
NONHDLC SERPL-MCNC: 153 MG/DL
PH URINE: 6.5
PLATELET # BLD AUTO: 242 K/UL
POTASSIUM SERPL-SCNC: 4.6 MMOL/L
PROLACTIN SERPL-MCNC: 9.9 NG/ML
PROT SERPL-MCNC: 6.9 G/DL
PROTEIN URINE: NORMAL
PT BLD: 12.9 SEC
RBC # BLD: 4.67 M/UL
RBC # FLD: 13.6 %
SODIUM SERPL-SCNC: 137 MMOL/L
SPECIFIC GRAVITY URINE: 1.03
T3 SERPL-MCNC: 98 NG/DL
T4 FREE SERPL-MCNC: 1.2 NG/DL
TRIGL SERPL-MCNC: 216 MG/DL
TSH SERPL-ACNC: 1.65 UIU/ML
UROBILINOGEN URINE: ABNORMAL
WBC # FLD AUTO: 4.47 K/UL

## 2022-06-16 PROCEDURE — 99214 OFFICE O/P EST MOD 30 MIN: CPT

## 2022-06-16 NOTE — HISTORY OF PRESENT ILLNESS
[No Pertinent Cardiac History] : no history of aortic stenosis, atrial fibrillation, coronary artery disease, recent myocardial infarction, or implantable device/pacemaker [Asthma] : asthma [No Adverse Anesthesia Reaction] : no adverse anesthesia reaction in self or family member [(Patient denies any chest pain, claudication, dyspnea on exertion, orthopnea, palpitations or syncope)] : Patient denies any chest pain, claudication, dyspnea on exertion, orthopnea, palpitations or syncope [COPD] : no COPD [Sleep Apnea] : no sleep apnea [Smoker] : not a smoker [Chronic Anticoagulation] : no chronic anticoagulation [Chronic Kidney Disease] : no chronic kidney disease [Diabetes] : no diabetes [FreeTextEntry1] : Left Ring Finger Surgery, as per Hand Surgeon [FreeTextEntry2] : 6/17/22 [FreeTextEntry3] : Dr. England [FreeTextEntry4] : 71 year old female with history of stable Hypertension, Reactive Airway Disease, Elevated Hemoglobin A1c, Hypercholesterolemia, Cervical Radiculopathy, Lumbar Radiculopathy, Bipolar Disorder, history as stated, presented for clearance evaluation prior to possible Left Ring Finger Surgery, as per Hand Surgeon.

## 2022-06-16 NOTE — ASSESSMENT
[Procedure Intermediate Risk] : the procedure risk is intermediate [Patient Intermediate Risk] : the patient is an intermediate risk [As per surgery] : as per surgery [Continue] : Continue medications as currently directed [Optimized for Surgery] : the patient is optimized for surgery [FreeTextEntry4] : 71 year old female found to have stable Hypertension, Reactive Airway Disease, Elevated Hemoglobin A1c, Hypercholesterolemia, Cervical Radiculopathy, Lumbar Radiculopathy, Bipolar Disorder,with the current prescription regimen as recommended, diet and life style modifications, as counseled. Prior results reviewed, interpreted and discussed with the patient during today's examination, as appropriate. Follow up, treatment plan and tests, as ordered.\par \par Possible Left Ring Finger Surgery, as per Hand Surgeon.\par \par EKG from June 6, 2022 showed sinus bradycardia at the rate of 57 bpm, known LAD, no new ST-T changes noted.\par \par PST results from 6/15/22 noted, discussed with the patient.\par \par Cardiology Clearance as updated from June 16, 2022 noted and discussed with the cardiologist. \par Patient is medically optimized to the best at this time for the stated emergency intervention.\par Patient is medically cleared.\par \par Total time spent : 30 minutes\par Including:\par Preparation prior to visit - Reviewing prior record, results of tests and Consultation Reports as applicable\par Conducting an appropriate H & P during today's encounter\par Appropriate orders for tests, medications and procedures, as applicable\par Counseling patient \par Note completion\par

## 2022-07-07 ENCOUNTER — RX RENEWAL (OUTPATIENT)
Age: 72
End: 2022-07-07

## 2022-07-12 ENCOUNTER — NON-APPOINTMENT (OUTPATIENT)
Age: 72
End: 2022-07-12

## 2022-07-12 ENCOUNTER — APPOINTMENT (OUTPATIENT)
Age: 72
End: 2022-07-12

## 2022-07-13 ENCOUNTER — NON-APPOINTMENT (OUTPATIENT)
Age: 72
End: 2022-07-13

## 2022-07-30 ENCOUNTER — APPOINTMENT (OUTPATIENT)
Age: 72
End: 2022-07-30

## 2022-08-01 DIAGNOSIS — R92.8 OTHER ABNORMAL AND INCONCLUSIVE FINDINGS ON DIAGNOSTIC IMAGING OF BREAST: ICD-10-CM

## 2022-08-22 ENCOUNTER — APPOINTMENT (OUTPATIENT)
Dept: ENDOCRINOLOGY | Facility: CLINIC | Age: 72
End: 2022-08-22

## 2022-09-13 ENCOUNTER — NON-APPOINTMENT (OUTPATIENT)
Age: 72
End: 2022-09-13

## 2022-11-12 ENCOUNTER — RX RENEWAL (OUTPATIENT)
Age: 72
End: 2022-11-12

## 2023-01-12 ENCOUNTER — RX RENEWAL (OUTPATIENT)
Age: 73
End: 2023-01-12

## 2023-01-12 RX ORDER — ATENOLOL 50 MG/1
50 TABLET ORAL
Qty: 30 | Refills: 5 | Status: ACTIVE | COMMUNITY
Start: 2019-05-11 | End: 1900-01-01

## 2023-08-27 ENCOUNTER — NON-APPOINTMENT (OUTPATIENT)
Age: 73
End: 2023-08-27

## 2023-09-23 ENCOUNTER — RX RENEWAL (OUTPATIENT)
Age: 73
End: 2023-09-23

## 2024-01-29 ENCOUNTER — RX RENEWAL (OUTPATIENT)
Age: 74
End: 2024-01-29

## 2024-01-31 ENCOUNTER — RX RENEWAL (OUTPATIENT)
Age: 74
End: 2024-01-31

## 2024-01-31 RX ORDER — SPIRONOLACTONE 25 MG/1
25 TABLET ORAL
Qty: 270 | Refills: 4 | Status: ACTIVE | COMMUNITY
Start: 2019-06-27 | End: 1900-01-01

## 2024-06-02 ENCOUNTER — NON-APPOINTMENT (OUTPATIENT)
Age: 74
End: 2024-06-02

## 2024-10-24 ENCOUNTER — EMERGENCY (EMERGENCY)
Facility: HOSPITAL | Age: 74
LOS: 1 days | Discharge: HOME | End: 2024-10-24
Attending: EMERGENCY MEDICINE | Admitting: EMERGENCY MEDICINE
Payer: MEDICARE

## 2024-10-24 VITALS
SYSTOLIC BLOOD PRESSURE: 158 MMHG | RESPIRATION RATE: 17 BRPM | TEMPERATURE: 98 F | OXYGEN SATURATION: 97 % | DIASTOLIC BLOOD PRESSURE: 81 MMHG | WEIGHT: 164.02 LBS | HEART RATE: 55 BPM

## 2024-10-24 LAB
ADD ON TEST-SPECIMEN IN LAB: SIGNIFICANT CHANGE UP
ALBUMIN SERPL ELPH-MCNC: 3.7 G/DL — SIGNIFICANT CHANGE UP (ref 3.3–5)
ALP SERPL-CCNC: 67 U/L — SIGNIFICANT CHANGE UP (ref 40–120)
ALT FLD-CCNC: 17 U/L — SIGNIFICANT CHANGE UP (ref 4–33)
ANION GAP SERPL CALC-SCNC: 8 MMOL/L — SIGNIFICANT CHANGE UP (ref 7–14)
APPEARANCE UR: CLEAR — SIGNIFICANT CHANGE UP
APTT BLD: 27.7 SEC — SIGNIFICANT CHANGE UP (ref 24.5–35.6)
AST SERPL-CCNC: 16 U/L — SIGNIFICANT CHANGE UP (ref 4–32)
BASOPHILS # BLD AUTO: 0.01 K/UL — SIGNIFICANT CHANGE UP (ref 0–0.2)
BASOPHILS NFR BLD AUTO: 0.2 % — SIGNIFICANT CHANGE UP (ref 0–2)
BILIRUB SERPL-MCNC: <0.2 MG/DL — SIGNIFICANT CHANGE UP (ref 0.2–1.2)
BILIRUB UR-MCNC: NEGATIVE — SIGNIFICANT CHANGE UP
BUN SERPL-MCNC: 16 MG/DL — SIGNIFICANT CHANGE UP (ref 7–23)
CALCIUM SERPL-MCNC: 8.8 MG/DL — SIGNIFICANT CHANGE UP (ref 8.4–10.5)
CHLORIDE SERPL-SCNC: 103 MMOL/L — SIGNIFICANT CHANGE UP (ref 98–107)
CO2 SERPL-SCNC: 26 MMOL/L — SIGNIFICANT CHANGE UP (ref 22–31)
COLOR SPEC: YELLOW — SIGNIFICANT CHANGE UP
CREAT SERPL-MCNC: 0.77 MG/DL — SIGNIFICANT CHANGE UP (ref 0.5–1.3)
DIFF PNL FLD: NEGATIVE — SIGNIFICANT CHANGE UP
EGFR: 81 ML/MIN/1.73M2 — SIGNIFICANT CHANGE UP
EOSINOPHIL # BLD AUTO: 0.12 K/UL — SIGNIFICANT CHANGE UP (ref 0–0.5)
EOSINOPHIL NFR BLD AUTO: 2.8 % — SIGNIFICANT CHANGE UP (ref 0–6)
GAS PNL BLDV: SIGNIFICANT CHANGE UP
GLUCOSE SERPL-MCNC: 99 MG/DL — SIGNIFICANT CHANGE UP (ref 70–99)
GLUCOSE UR QL: NEGATIVE MG/DL — SIGNIFICANT CHANGE UP
HCT VFR BLD CALC: 37.3 % — SIGNIFICANT CHANGE UP (ref 34.5–45)
HGB BLD-MCNC: 12.3 G/DL — SIGNIFICANT CHANGE UP (ref 11.5–15.5)
IANC: 1.71 K/UL — LOW (ref 1.8–7.4)
IMM GRANULOCYTES NFR BLD AUTO: 0.2 % — SIGNIFICANT CHANGE UP (ref 0–0.9)
INR BLD: 1.03 RATIO — SIGNIFICANT CHANGE UP (ref 0.85–1.16)
KETONES UR-MCNC: NEGATIVE MG/DL — SIGNIFICANT CHANGE UP
LEUKOCYTE ESTERASE UR-ACNC: NEGATIVE — SIGNIFICANT CHANGE UP
LYMPHOCYTES # BLD AUTO: 2 K/UL — SIGNIFICANT CHANGE UP (ref 1–3.3)
LYMPHOCYTES # BLD AUTO: 46 % — HIGH (ref 13–44)
MCHC RBC-ENTMCNC: 27.8 PG — SIGNIFICANT CHANGE UP (ref 27–34)
MCHC RBC-ENTMCNC: 33 GM/DL — SIGNIFICANT CHANGE UP (ref 32–36)
MCV RBC AUTO: 84.2 FL — SIGNIFICANT CHANGE UP (ref 80–100)
MONOCYTES # BLD AUTO: 0.5 K/UL — SIGNIFICANT CHANGE UP (ref 0–0.9)
MONOCYTES NFR BLD AUTO: 11.5 % — SIGNIFICANT CHANGE UP (ref 2–14)
NEUTROPHILS # BLD AUTO: 1.71 K/UL — LOW (ref 1.8–7.4)
NEUTROPHILS NFR BLD AUTO: 39.3 % — LOW (ref 43–77)
NITRITE UR-MCNC: NEGATIVE — SIGNIFICANT CHANGE UP
NRBC # BLD: 0 /100 WBCS — SIGNIFICANT CHANGE UP (ref 0–0)
NRBC # FLD: 0 K/UL — SIGNIFICANT CHANGE UP (ref 0–0)
PH UR: 6.5 — SIGNIFICANT CHANGE UP (ref 5–8)
PLATELET # BLD AUTO: 193 K/UL — SIGNIFICANT CHANGE UP (ref 150–400)
POTASSIUM SERPL-MCNC: 4.1 MMOL/L — SIGNIFICANT CHANGE UP (ref 3.5–5.3)
POTASSIUM SERPL-SCNC: 4.1 MMOL/L — SIGNIFICANT CHANGE UP (ref 3.5–5.3)
PROT SERPL-MCNC: 6.3 G/DL — SIGNIFICANT CHANGE UP (ref 6–8.3)
PROT UR-MCNC: NEGATIVE MG/DL — SIGNIFICANT CHANGE UP
PROTHROM AB SERPL-ACNC: 12 SEC — SIGNIFICANT CHANGE UP (ref 9.9–13.4)
RBC # BLD: 4.43 M/UL — SIGNIFICANT CHANGE UP (ref 3.8–5.2)
RBC # FLD: 13.5 % — SIGNIFICANT CHANGE UP (ref 10.3–14.5)
SODIUM SERPL-SCNC: 137 MMOL/L — SIGNIFICANT CHANGE UP (ref 135–145)
SP GR SPEC: 1.04 — HIGH (ref 1–1.03)
TROPONIN T, HIGH SENSITIVITY RESULT: 13 NG/L — SIGNIFICANT CHANGE UP
UROBILINOGEN FLD QL: 0.2 MG/DL — SIGNIFICANT CHANGE UP (ref 0.2–1)
WBC # BLD: 4.35 K/UL — SIGNIFICANT CHANGE UP (ref 3.8–10.5)
WBC # FLD AUTO: 4.35 K/UL — SIGNIFICANT CHANGE UP (ref 3.8–10.5)

## 2024-10-24 PROCEDURE — 99291 CRITICAL CARE FIRST HOUR: CPT

## 2024-10-24 PROCEDURE — 70496 CT ANGIOGRAPHY HEAD: CPT | Mod: 26,MC

## 2024-10-24 PROCEDURE — 93010 ELECTROCARDIOGRAM REPORT: CPT

## 2024-10-24 PROCEDURE — 71045 X-RAY EXAM CHEST 1 VIEW: CPT | Mod: 26

## 2024-10-24 PROCEDURE — 70450 CT HEAD/BRAIN W/O DYE: CPT | Mod: 26,XU,MC

## 2024-10-24 PROCEDURE — 0042T: CPT | Mod: MC

## 2024-10-24 PROCEDURE — 70498 CT ANGIOGRAPHY NECK: CPT | Mod: 26,MC

## 2024-10-24 RX ORDER — ASPIRIN 325 MG
81 TABLET ORAL DAILY
Refills: 0 | Status: DISCONTINUED | OUTPATIENT
Start: 2024-10-24 | End: 2024-10-28

## 2024-10-24 RX ORDER — ATENOLOL 25 MG/1
50 TABLET ORAL DAILY
Refills: 0 | Status: DISCONTINUED | OUTPATIENT
Start: 2024-10-24 | End: 2024-10-28

## 2024-10-24 RX ADMIN — ATENOLOL 50 MILLIGRAM(S): 25 TABLET ORAL at 21:22

## 2024-10-24 RX ADMIN — Medication 50 MILLIGRAM(S): at 22:16

## 2024-10-24 RX ADMIN — Medication 300 MILLIGRAM(S): at 20:24

## 2024-10-24 NOTE — CONSULT NOTE ADULT - ASSESSMENT
72 yo L-H F w/ pmhx of HTN, [place], presents to [facility] ED as code stroke for word finding difficulty & confusion. LKW 1230 pm 10/24 when patient last in normal state of health to daughter who spoke with her over the phone. EMS called for evaluation who brought patient in for evaluation. Patient significantly improved on arrival. Exam as above, very subtle dysfluency. CTH w/o acute findings. CTA w/ mod basilar stenosis, no LVO. CTP without core/penumbra.     NIHSS: 0  mRS: 2   ABCD2: 5     Patient is not a thrombolytic candidate because they are without disabling deficits on examination  Patient is not a mechanical thrombectomy candidate because no evidence of LVO.    Impression: Improving, very subtly nonfluent aphasia due to hemispheric dysfunction (more likely L hemispheric, though given her L-handedness there may be a higher possibility of R hemispheric dominance) from acute ischemic stroke or transient ischemic attack. Mechanism unknown.     Recommendations    Imaging  [] MRI brain w/o contrast   [] TTE     Meds  [] Start Aspirin 81 mg qd indefinitely  [] Load Plavix 300 mg then maintenance dose of 75 mg qd for 3 weeks followed by ASA monotherapy per CHANCE trial  [] Atorvastatin 80, titrate to LDL<70  [] DVT prophylaxis    Labs  [] HbA1C and Lipid Panel    Other  [] Telemonitoring;  Stroke Neurochecks and Vital signs q4hr   [] Permissive HTN up to 220/120 mmHg for first 24 hours after the symptom onset followed by gradual normotension.   [] BGM goals 140-180  [] PT/OT evaluation  [] NPO until clears Dysphagia screen, otherwise Swallow evaluation  [] Patient should follow up with Stroke NP, Jackie Gallegos or Jessica Hardwick, in clinic at [address] [place] [address]. Please email [email or web address] w/ basic PHI.     Patient case discussed with stroke fellow, Dr. Guidry, under supervision of stroke attending, Dr. Libman. Patient to be seen on morning rounds.     Please call with questions: [phone]  74 yo L-H F w/ pmhx of HTN, [place], presents to [facility] ED as code stroke for word finding difficulty & confusion. LKW 1230 pm 10/24 when patient last in normal state of health to daughter who spoke with her over the phone. EMS called for evaluation who brought patient in for evaluation. Patient significantly improved on arrival. Exam as above, very subtle dysfluency. CTH w/o acute findings. CTA w/ mod basilar stenosis, no LVO. CTP without core/penumbra.     NIHSS: 0  mRS: 2   ABCD2: 5     Patient is not a thrombolytic candidate because they are without disabling deficits on examination  Patient is not a mechanical thrombectomy candidate because no evidence of LVO.    Impression: Improving, very subtly nonfluent aphasia due to hemispheric dysfunction (more likely L hemispheric, though given her L-handedness there may be a higher possibility of R hemispheric dominance) from acute ischemic stroke or transient ischemic attack. Mechanism unknown. Due to recurrent nature in past, unclear if having transient ischemic attacks vs can rule out focal non motor seizures with impaired awareness.    Recommendations    Imaging  [] MRI brain w/o contrast   [] TTE   [] Outpatient vEEG if MRI unrevealing    Meds  [] Start Aspirin 81 mg qd indefinitely  [] Load Plavix 300 mg then maintenance dose of 75 mg qd for 3 weeks followed by ASA monotherapy per CHANCE trial  [] Atorvastatin 80, titrate to LDL<70  [] DVT prophylaxis    Labs  [] HbA1C and Lipid Panel    Other  [] Telemonitoring;  Stroke Neurochecks and Vital signs q4hr   [] Permissive HTN up to 220/120 mmHg for first 24 hours after the symptom onset followed by gradual normotension.   [] BGM goals 140-180  [] PT/OT evaluation  [] NPO until clears Dysphagia screen, otherwise Swallow evaluation  [] Patient should follow up with Stroke NP, Jackie Gallegos or Jessica Hardwick, in clinic at [address] [place] [address]. Please email [email or web address] w/ basic PHI.     Patient case discussed with stroke fellow, Dr. Guidry, under supervision of stroke attending, Dr. Libman. Patient to be seen on morning rounds.     Please call with questions: [phone]

## 2024-10-24 NOTE — ED PROVIDER NOTE - PROGRESS NOTE DETAILS
Destiny Mckoy MD PGY-3: CTs without acute actionable findings; Destiny Mckoy MD PGY-3: CTs without acute actionable findings; neuro recommending obs for MRI, patient and daughter agreeable to CDU

## 2024-10-24 NOTE — ED CDU PROVIDER INITIAL DAY NOTE - CLINICAL SUMMARY MEDICAL DECISION MAKING FREE TEXT BOX
73yoF PMH HTN, pituitary mass (stable), p/w confusion, dizziness, and word finding difficulty since 1:30PM for 1 hour , self resolved. code stroke on ED arrival  ED w/u revealng normal labs, CTA H/N revealing moderate basilar artery stenosis otherwise no acute findings. neuro evaluated pt, recommending asa/plavix load and CDU for MRI's, echo, tele.

## 2024-10-24 NOTE — ED PROVIDER NOTE - ATTENDING CONTRIBUTION TO CARE
Attending note:   After face to face evaluation of this patient, I concur with above noted hx, pe, and care plan for this patient.  Velasquez: 73-year-old female with history of hypertension on atenolol but did not take today.  Patient was last known normal at 12:30 PM when she was texting her daughter.  Patient is watching her 11-year-old grandkids today and was in her usual state of health at that time.  Sometime after patient was having difficulty turning on the stove to heat up lunch.  Patient was also having diffuse headache with some blurry vision and nausea.  That is mildly improved.  When daughter came daughter noticed that patient was confused and having word finding difficulty.  Daughter noticed patient called sock scissors and slow to answer questions.  Patient has history of gait imbalance and is getting physical therapy for that.  Patient's blood pressure is minimally elevated but otherwise vitals are normal.  Patient has no facial asymmetry or droop noted.  Patient answers questions but is having some slowness in her responses.  Patient appears to have some difficulty getting the words out.  Motor strength is 5 out of 5 and there is no pronator drift.  Sensation is normal bilaterally.  Lungs are clear and heart is regular rate and rhythm.  Abdomen is soft.  Speech is clear however minimal slow.  A stroke code was called from triage and neurology was present outside of CT.  Full CT scan with angio and perfusion study ordered.  Labs pending and fingerstick normal.  Awaiting results now.  Will also get EKG. Attending note:   After face to face evaluation of this patient, I concur with above noted hx, pe, and care plan for this patient.  Velasquez: 73-year-old female with history of hypertension on atenolol but did not take today.  Patient was last known normal at 12:30 PM when she was texting her daughter.  Patient is watching her 11-year-old grandkids today and was in her usual state of health at that time.  Sometime after patient was having difficulty turning on the stove to heat up lunch.  Patient was also having diffuse headache with some blurry vision and nausea.  That is mildly improved.  When daughter came daughter noticed that patient was confused and having word finding difficulty.  Daughter noticed patient called sock scissors and slow to answer questions.  Patient has history of gait imbalance and is getting physical therapy for that.  Patient's blood pressure is minimally elevated but otherwise vitals are normal.  Patient has no facial asymmetry or droop noted.  Patient answers questions but is having some slowness in her responses.  Patient appears to have some difficulty getting the words out.  Motor strength is 5 out of 5 and there is no pronator drift.  Sensation is normal bilaterally.  Lungs are clear and heart is regular rate and rhythm.  Abdomen is soft.  Speech is clear however minimal slow.  A stroke code was called from triage and neurology was present outside of CT.  Full CT scan with angio and perfusion study ordered.  Labs pending and fingerstick normal.  Awaiting results now.  Will also get EKG. NIHSS 2.

## 2024-10-24 NOTE — ED CDU PROVIDER INITIAL DAY NOTE - OBJECTIVE STATEMENT
73-year-old female PMH HTN, brought in by daughter for evaluation of word finding difficulties.  Called as code stroke at triage.  Water at bedside for assistance with history.  She reports that she was texting with her mother at around 12:30 PM today and seemed to be in her normal state of health, was babysitting for her grandchildren. 2-3 hours later patient's granddaughter called patient's daughter to note that patient seemed confused, was having difficulty turning on the stove, and was not responding to questions appropriately. Was reportedly referring to scissors as socks, for example. Also endorsing nausea and headache with mild blurred vision bilaterally.    CDU PA: agree w/. above. 73yoF PMH HTN, pituitary mass (stable), p/w confusion, dizziness, and word finding difficulty since 1:30PM. states she was texting her mother about serving pasta for lunch and she could not figure how to do it. Her granddaughter had said she was acting strange, and called her mother (patients daughter) who said she was having difficulty finding her words and brought her to ED. Pt endorses some bilateral blurry vision transiently as well. has had these symptoms a few times over the past year but did not tell anyone. no hx of strokes. nonsmoker. no numbness, tingling, fevers, cough, cp, sob, n/v/d/c, or abd pain.   ED w/u revealng normal labs, CTA H/N revealing moderate basilar artery stenosis otherwise no acute findings. neuro evaluated pt, recommending asa/plavix load and CDU for MRI's, echo, tele.

## 2024-10-24 NOTE — ED ADULT TRIAGE NOTE - CHIEF COMPLAINT QUOTE
pt arrives c/o brief period of Altered mental status, headache, nausea w/o vomiting, blurry vision, and dizziness beginning at 230pm, LKW 2pm. 5/5 upper and lower extremity strength, per daughter that arrives with patient, baseline steady gait  A&Ox4 at this time  MD diana called for stroke eval   history of HTN

## 2024-10-24 NOTE — ED PROVIDER NOTE - PHYSICAL EXAMINATION
GENERAL: Awake, alert, nontoxic appearing  HEAD: NC/AT, neck supple, moist mucous membranes  EYES: PERRL, EOM grossly intact, sclera anicteric  LUNGS: normal WOB on RA, CTAB, no wheezes or crackles   CARDIAC: RRR, no m/r/g  ABDOMEN: Soft, non tender, non distended, no rebound, no guarding  EXT: No edema, no calf tenderness, no deformities.  NEURO: A&Ox3. Moving all extremities without focal deficit. No pronator drift. CN II-XII grossly intact bilaterally. Speech is fluent but note patient somewhat slow to respond to questions, although does answer appropriately  SKIN: Warm and dry. No rash.  PSYCH: Normal affect.

## 2024-10-24 NOTE — ED CDU PROVIDER INITIAL DAY NOTE - ATTENDING APP SHARED VISIT CONTRIBUTION OF CARE
Attending Contribution to Care: Attending note:   After face to face evaluation of this patient, I concur with above noted hx, pe, and care plan for this patient.  Velasquez: 73-year-old female with history of hypertension on atenolol but did not take today.  Patient was last known normal at 12:30 PM when she was texting her daughter.  Patient is watching her 11-year-old grandkids today and was in her usual state of health at that time.  Sometime after patient was having difficulty turning on the stove to heat up lunch.  Patient was also having diffuse headache with some blurry vision and nausea.  That is mildly improved.  When daughter came daughter noticed that patient was confused and having word finding difficulty.  Daughter noticed patient called sock scissors and slow to answer questions.  Patient has history of gait imbalance and is getting physical therapy for that.  Patient's blood pressure is minimally elevated but otherwise vitals are normal.  Patient has no facial asymmetry or droop noted.  Patient answers questions but is having some slowness in her responses.  Patient appears to have some difficulty getting the words out.  Motor strength is 5 out of 5 and there is no pronator drift.  Sensation is normal bilaterally.  Lungs are clear and heart is regular rate and rhythm.  Abdomen is soft.  Speech is clear however minimal slow.  A stroke code was called from triage and neurology was present outside of CT.  Full CT scan with angio and perfusion study ordered.  Labs pending and fingerstick normal.  Awaiting results now. NIHSS 2.  Patient placed in CDU for MRI and Plavix and aspirin to be started. EKG normal sinus rhythm with no ST or T wave changes.

## 2024-10-24 NOTE — ED ADULT NURSE NOTE - OBJECTIVE STATEMENT
Patience RN: CODE stroke activated in triage. Pt accompanied by daughter. pt a&ox4, and alert at time of assessment at CT. As per pts daughter pts LKN was 1230pm today, pts daughter states "she called me at 1230 and said she wasn't able to text me because she couldn't read the words. Then around 130 my kids told me she 'wasn't acting right,' ex, wasn't able to identify what a sock was." Pt at this time does not remember episode. Pt reports feeling headache, nausea w/o vomiting, blurry vision, and dizziness beginning at 230pm, after incident occurred. hx HTN. pt denies chest pain, sob, n+v, headache, fever, chills numbness, tingling, fever, chills, vision changes at this time. No facial droop present. Breathing even, unlabored. As per daughter, "she normally has an unsteady gait, she goes to PT for it." 20g IV placed to right ac, labs collected and sent. Report given to primary RN Willam.

## 2024-10-24 NOTE — ED PROVIDER NOTE - CLINICAL SUMMARY MEDICAL DECISION MAKING FREE TEXT BOX
73Y F PMH HTN on atenolol, brought in by daughter for evaluation of headache, mild confusion, and word finding difficulties.  Called as code stroke at triage.  LKW 12:30 PM today.  Initial evaluation, VS and fingerstick nonactionable.  Patient is nontoxic-appearing, no that she is slightly slow to respond to questions but is able to respond appropriately.  Able to identify objects including watch and cell phone.  No focal neurologic deficits are appreciated.  She is endorsing a mild headache.  Neuro team at bedside, recommending full stroke imaging.  Labs obtained.  Dispo pending imaging, lab results.

## 2024-10-24 NOTE — CONSULT NOTE ADULT - TIME BILLING
73-year-old left-handed lady evaluated in the CDU at [facility] on 10/25/2024 after an episode of probable aphasia.  History and exam as above, with minor changes.  ROS otherwise negative.  CTA neck (10/24/2024) to my eye showed mild calcified plaque at the right carotid bifurcation and was otherwise unremarkable.  CTA head (10/24/2024) to my eye showed moderate proximal-mid basilar stenosis and was otherwise unremarkable.  MRI brain (10/24/2024) to my eye showed mild ventriculomegaly and was otherwise unremarkable.  TTE (10/24/2024) showed no cardiac source of embolism.  LDL (10/24/2024) = 127.    Impression.  She had 1 episode of migraine many years ago when she was immediately postpartum.  On 10/24/2024 she developed binocular blurring of vision; headache, nausea, and then speech disturbance, perhaps word finding difficulties and/or speaking "gibberish".  The episode lasted 60-90 minutes and she returned to normal.  Even though she is left-handed, her presentation is likely consistent with left hemispheric dysfunction.  Etiology is uncertain but I suspect migraine with aura.  While less likely, we will treat her as if she had a TIA of uncertain mechanism, because this possibility, while less likely, has more serious consequences.  CTA showed perhaps moderate basilar stenosis but I suspect that this is asymptomatic and an incidental finding.  For a much longer period of time, she has had some type of gait disturbance, possibly imbalance and has fallen at least once; she has also had at least a couple of episodes of urinary incontinence and her daughter feels that she has developed some mild cognitive or memory deficits.  MRI showed slightly enlarged ventricles raising the possibility of NPH.  While I suspect that her ventriculomegaly is asymptomatic, this is uncertain and can be further evaluated electively.  Suggest.  Plavix 300 mg loading dose, then 75 mg daily for 3 weeks; aspirin 81 mg daily; atorvastatin 80 mg daily, target LDL less than 70; PT/OT/speech therapy; follow-up with both stroke and general neurology; from neurovascular standpoint, cleared for discharge.

## 2024-10-24 NOTE — ED CDU PROVIDER INITIAL DAY NOTE - PHYSICAL EXAMINATION
CONSTITUTIONAL: Well-appearing; well-nourished; in no apparent distress;  HEAD: Normocephalic, atraumatic;  EYES: PERRL, EOM intact, conjunctiva and sclera WNL;  ENT: normal nose; no rhinorrhea; unremarkable pharynx  NECK/LYMPH: Supple; non-tender;  CARD: Normal S1, S2; no murmurs, rubs, or gallops noted  RESP: Normal chest excursion with respiration; breath sounds clear and equal bilaterally; no wheezes, rhonchi, or rales noted  ABD/GI: soft, non-distended; non-tender; no palpable organomegaly, no pulsatile mass  EXT/MS: moves all extremities; distal pulses are normal, no pedal edema  SKIN: Normal for age and race; warm; dry; good turgor; no apparent lesions or exudate noted  NEURO: Awake, alert, oriented x 3, no gross deficits, CN II-XII grossly intact, no motor or sensory deficit noted, no drift, no dysmetria, 5/5 strength  PSYCH: Normal mood; appropriate affect

## 2024-10-24 NOTE — ED PROVIDER NOTE - OBJECTIVE STATEMENT
73-year-old female PMH HTN, brought in by daughter for evaluation of word finding difficulties.  Called as code stroke at triage.  Water at bedside for assistance with history.  She reports that she was texting with her mother at around 12:30 PM today and seemed to be in her normal state of health, was babysitting for her grandchildren. 2-3 hours later patient's granddaughter called patient's daughter to note that patient seemed confused, was having difficulty turning on the stove, and was not responding to questions appropriately. Was reportedly referring to scissors as socks, for example. Also endorsing nausea and headache with mild blurred vision bilaterally.

## 2024-10-25 ENCOUNTER — RESULT REVIEW (OUTPATIENT)
Age: 74
End: 2024-10-25

## 2024-10-25 VITALS
OXYGEN SATURATION: 100 % | TEMPERATURE: 97 F | HEART RATE: 60 BPM | RESPIRATION RATE: 17 BRPM | SYSTOLIC BLOOD PRESSURE: 114 MMHG | DIASTOLIC BLOOD PRESSURE: 63 MMHG

## 2024-10-25 PROCEDURE — 70551 MRI BRAIN STEM W/O DYE: CPT | Mod: 26,MC

## 2024-10-25 PROCEDURE — 93306 TTE W/DOPPLER COMPLETE: CPT | Mod: 26

## 2024-10-25 PROCEDURE — 99285 EMERGENCY DEPT VISIT HI MDM: CPT

## 2024-10-25 PROCEDURE — 99239 HOSP IP/OBS DSCHRG MGMT >30: CPT

## 2024-10-25 RX ORDER — ATORVASTATIN CALCIUM 10 MG/1
1 TABLET, FILM COATED ORAL
Qty: 60 | Refills: 0
Start: 2024-10-25 | End: 2024-12-23

## 2024-10-25 RX ORDER — ASPIRIN 325 MG
1 TABLET ORAL
Qty: 60 | Refills: 0
Start: 2024-10-25 | End: 2024-12-23

## 2024-10-25 RX ADMIN — Medication 81 MILLIGRAM(S): at 12:20

## 2024-10-25 RX ADMIN — Medication 75 MILLIGRAM(S): at 12:20

## 2024-10-25 RX ADMIN — Medication 40 MILLIGRAM(S): at 09:04

## 2024-10-25 RX ADMIN — Medication 100 MILLIGRAM(S): at 02:55

## 2024-10-25 NOTE — ED CDU PROVIDER DISPOSITION NOTE - NSFOLLOWUPINSTRUCTIONS_ED_ALL_ED_FT
you were seen in the ED for dizziness and word finding difficulties. you were placed in our observation unit, had an MRI head, echocardiogram of your heart and a neurology and cardiology evaluation.    you were prescribed plavix to take for 3 weeks,  daily baby asprin of 81mg,  and atorvastatin 80mg daily.    please follow up with your cardiologist and neurologist as scheduled.    SEEK IMMEDIATE MEDICAL CARE IF YOU HAVE ANY OF THE FOLLOWING SYMPTOMS: vomiting, changes in your vision or speech, weakness in your arms or legs, trouble speaking or swallowing, chest pain, abdominal pain, shortness of breath, sweating, bleeding, headache, neck pain, or fever. you were seen in the ED for dizziness and word finding difficulties. you were placed in our observation unit, had an MRI head, echocardiogram of your heart and a neurology and cardiology evaluation.     this was likely a complex migraine but we are treating you for a transient ischemic attack.     you were prescribed plavix to take for 3 weeks,  daily baby asprin of 81mg chewable,  and atorvastatin 80mg daily.    please follow up with your cardiologist, we recommend you get a halter monitor.    for neurology please follow up with Stroke NP, Jackie Gallegos or Jessica Hardwick, in clinic at [address] [place] [address]. they should call you to help make an appointment.    you were also found to have so ventriculomegaly and told to follow up with general neurology. Someone from the ED should contact you to help schedule your specialty appointment. if someone does not contact you please use a number provided below to help schedule your appointment. You are to follow-up in the next 1-2 weeks with [facility] Division of Neurology at [facility]. Please call [phone] for an appointment.     SEEK IMMEDIATE MEDICAL CARE IF YOU HAVE ANY OF THE FOLLOWING SYMPTOMS: vomiting, changes in your vision or speech, weakness in your arms or legs, trouble speaking or swallowing, chest pain, abdominal pain, shortness of breath, sweating, bleeding, headache, neck pain, or fever.

## 2024-10-25 NOTE — CONSULT NOTE ADULT - SUBJECTIVE AND OBJECTIVE BOX
Cardiovascular Disease Initial Evaluation  Date of service: 10-25-24 @ 12:15    CHIEF COMPLAINT:  AMS    HISTORY OF PRESENT ILLNESS: 73-year-old female PMH HTN, brought in by daughter for evaluation of word finding difficulties.  Upon arrival to ED, code stroke called.  As per daughter who is at bedside, she reports that she was texting with her mother on day of admission and seemed to be in her normal state of health, was babysitting for her grandchildren. Later that day patient's granddaughter called patient's daughter to note that patient seemed confused, was having difficulty turning on the stove, and was not responding to questions appropriately. Was reportedly referring to scissors as socks, for example. Also endorsing nausea and headache with mild blurred vision bilaterally. Patient now back to her baseline. She denies chest pain or SOB. States she has a cardiologist but has not seen him in several years. Denies CAD history.       Allergies    No Known Allergies    Intolerances    	    MEDICATIONS:  aspirin  chewable 81 milliGRAM(s) Oral daily  atenolol  Tablet 50 milliGRAM(s) Oral daily  clopidogrel Tablet 75 milliGRAM(s) Oral daily        traZODone 50 milliGRAM(s) Oral at bedtime            PAST MEDICAL & SURGICAL HISTORY:      FAMILY HISTORY:  No pertinent family history in first degree relatives        SOCIAL HISTORY:    The patient is a nonsmoker       REVIEW OF SYSTEMS:  See HPI, otherwise complete 14 point review of systems negative    [x ] All others negative	  [ ] Unable to obtain    PHYSICAL EXAM:  T(C): 36.3 (10-25-24 @ 10:02), Max: 36.9 (10-24-24 @ 16:30)  HR: 60 (10-25-24 @ 10:02) (54 - 88)  BP: 112/58 (10-25-24 @ 10:02) (112/58 - 184/91)  RR: 16 (10-25-24 @ 10:02) (16 - 18)  SpO2: 100% (10-25-24 @ 10:02) (96% - 100%)  Wt(kg): --  I&O's Summary      Appearance: No Acute Distress; resting comfortably  HEENT:  Normal oral mucosa, PERRL, EOMI	  Cardiovascular: Normal S1 S2, No JVD, No murmurs/rubs/gallops  Respiratory: Normal respiratory effort; Lungs clear to auscultation bilaterally  Gastrointestinal:  Soft, Non-tender, + BS	  Skin: No rashes, No ecchymoses, No cyanosis	  Neurologic: Non-focal; no weakness  Extremities: No clubbing, cyanosis or edema  Vascular: Peripheral pulses palpable 2+ bilaterally  Psychiatry: A & O x 3, Mood & affect appropriate    Laboratory Data:	 	    CBC Full  -  ( 24 Oct 2024 16:40 )  WBC Count : 4.35 K/uL  Hemoglobin : 12.3 g/dL  Hematocrit : 37.3 %  Platelet Count - Automated : 193 K/uL  Mean Cell Volume : 84.2 fL  Mean Cell Hemoglobin : 27.8 pg  Mean Cell Hemoglobin Concentration : 33.0 gm/dL  Auto Neutrophil # : 1.71 K/uL  Auto Lymphocyte # : 2.00 K/uL  Auto Monocyte # : 0.50 K/uL  Auto Eosinophil # : 0.12 K/uL  Auto Basophil # : 0.01 K/uL  Auto Neutrophil % : 39.3 %  Auto Lymphocyte % : 46.0 %  Auto Monocyte % : 11.5 %  Auto Eosinophil % : 2.8 %  Auto Basophil % : 0.2 %    10-24    137  |  103  |  16  ----------------------------<  99  4.1   |  26  |  0.77    Ca    8.8      24 Oct 2024 16:40    TPro  6.3  /  Alb  3.7  /  TBili  <0.2  /  DBili  x   /  AST  16  /  ALT  17  /  AlkPhos  67  10-24      proBNP:   Lipid Profile:   HgA1c:   TSH:       CARDIAC MARKERS:            Interpretation of Telemetry: Sinus	    ECG:  Sinus rhythm  RADIOLOGY:  OTHER: 	    PREVIOUS DIAGNOSTIC TESTING:    [ x] Echocardiogram:     1. Left ventricular cavity is normal in size. Left ventricular wall thickness is normal. Left ventricular systolic function is normal with an ejection fraction of 57 % by Lyman's method of disks. There are no regional wall motion abnormalities seen.   2. Normal right ventricular cavity size and probably normal right ventricular systolic function.   3. Structurally normal mitral valve with normal leaflet excursion. There is calcification of the mitral valve annulus. There is trace mitral regurgitation.   4. The aortic valve appears trileaflet with normal systolic excursion. There is calcification of the aortic valve leaflets. There is mild to moderate aortic regurgitation.  [ ] Catheterization:  [ ] Stress Test:  	    Assessment:  73-year-old female PMH HTN, brought in by daughter for evaluation of word finding difficulties.    Plan of Care:    #TIA  - MRI with no acute findings  - Echo shows normal LV systolic function and mild to moderate AR  - EKG shows sinus rhythm  - No arrhythmias seen on telemetry  - No further inpatient cardiac testing at this time  - Patient may follow up with her cardiologist for further assessment. May benefit from holter monitoring.   - Antiplatelet therapy as per neurology team    #HTN  - BP Acceptable    #HLD  - Statin therapy    #ACP (advance care planning)-  Advanced care planning was discussed with the patient and daughter.  Risks, benefits and alternatives of medical treatment and procedures were discussed in detail and all questions were answered. 30 additional minutes spent addressing advance care plans.      Complex medical decision making in a patient with multiple co-morbidities.   77 minutes spent on total encounter; more than 50% of the visit was spent counseling and/or coordinating care by the attending physician.   	  Juan Sifuentes DO [facility]  Cardiovascular Diseases  [phone]

## 2024-10-25 NOTE — ED CDU PROVIDER DISPOSITION NOTE - CLINICAL COURSE
73yoF PMH HTN, pituitary mass (stable), p/w confusion, dizziness, and word finding difficulty since 1:30PM 10/24/23 for 1 hour , self resolved. code stroke on ED arrival. ED w/u revealing normal labs, CTA H/N revealing moderate basilar artery stenosis otherwise no acute findings. neuro evaluated pt, recommending asa/plavix/ statin load. patient was placed in CDU for MRI head which was negative, echo which was wnl, teledoc. patient improved and dc-ed with follow up and meds. 73yoF PMH HTN, pituitary mass (stable), p/w confusion, dizziness, and word finding difficulty since 1:30PM 10/24/23 for 1 hour , self resolved. code stroke on ED arrival. ED w/u revealing normal labs, CTA H/N revealing moderate basilar artery stenosis otherwise no acute findings. neuro evaluated pt, recommending asa/plavix/ statin load. patient was placed in CDU for MRI head which was negative, echo which was wnl, teledoc. patient referred to stroke neuro f/u and regular f/u. patient told to f/u with her cardiologist.

## 2024-10-25 NOTE — ED CDU PROVIDER SUBSEQUENT DAY NOTE - PHYSICAL EXAMINATION
CONSTITUTIONAL: Well-appearing; well-nourished; in no apparent distress;  HEAD: Normocephalic, atraumatic;  EYES: PERRL, EOM intact, conjunctiva and sclera WNL;  ENT: normal nose; no rhinorrhea; unremarkable pharynx  NECK/LYMPH: Supple; non-tender;  CARD: Normal S1, S2; no murmurs, rubs, or gallops noted  RESP: Normal chest excursion with respiration; breath sounds clear and equal bilaterally; no wheezes, rhonchi, or rales noted  ABD/GI: soft, non-distended; non-tender; no palpable organomegaly, no pulsatile mass  EXT/MS: moves all extremities; distal pulses are normal, no pedal edema  SKIN: Normal for age and race; warm; dry; good turgor; no apparent lesions or exudate noted  NEURO: Awake, alert, oriented x 3, no gross deficits, CN II-XII grossly intact, no motor or sensory deficit noted, no drift, no dysmetria, 5/5 strength  PSYCH: Normal mood; appropriate affect Quality 110: Preventive Care And Screening: Influenza Immunization: Influenza Immunization Administered during Influenza season Detail Level: Detailed Quality 431: Preventive Care And Screening: Unhealthy Alcohol Use - Screening: Patient not identified as an unhealthy alcohol user when screened for unhealthy alcohol use using a systematic screening method Quality 226: Preventive Care And Screening: Tobacco Use: Screening And Cessation Intervention: Patient screened for tobacco use and is an ex/non-smoker Quality 130: Documentation Of Current Medications In The Medical Record: Current Medications Documented

## 2024-10-25 NOTE — ED CDU PROVIDER SUBSEQUENT DAY NOTE - HISTORY
72 y/o F presents to the ED for word finding difficulty and dizziness. CTA showed basilar artery stenosis. Evaluated by neuro who recommends MRI head. PT in CDU for MRI brain in the AM and TTE if MRI concerning for CVA.     Interval hx: Pt reassessed and resting comfortably, symptoms resolved. VSS. Denies any complaints at this time. Plan for MRI later today.

## 2024-10-25 NOTE — ED CDU PROVIDER DISPOSITION NOTE - ATTENDING CONTRIBUTION TO CARE
73yoF PMH HTN, pituitary mass (stable), p/w confusion, dizziness, and word finding difficulty since 1:30PM 10/24/23 for 1 hour , self resolved. code stroke on ED arrival. ED w/u revealing normal labs, CTA H/N revealing moderate basilar artery stenosis otherwise no acute findings. neuro evaluated pt, recommending asa/plavix/ statin load. patient was placed in CDU for MRI head which was negative, echo which was wnl, teledoc. patient referred to stroke neuro f/u and regular f/u. patient told to f/u with her cardiologist.

## 2024-10-25 NOTE — ED CDU PROVIDER DISPOSITION NOTE - PATIENT PORTAL LINK FT
You can access the FollowMyHealth Patient Portal offered by [facility] by registering at the following website: [email or web address]. By joining [facility]’s FollowMyHealth portal, you will also be able to view your health information using other applications (apps) compatible with our system.

## 2024-11-11 ENCOUNTER — NON-APPOINTMENT (OUTPATIENT)
Age: 74
End: 2024-11-11

## 2024-11-11 ENCOUNTER — APPOINTMENT (OUTPATIENT)
Dept: SPINE | Facility: CLINIC | Age: 74
End: 2024-11-11
Payer: MEDICARE

## 2024-11-11 VITALS
OXYGEN SATURATION: 96 % | WEIGHT: 178 LBS | RESPIRATION RATE: 16 BRPM | BODY MASS INDEX: 30.39 KG/M2 | HEIGHT: 64 IN | HEART RATE: 67 BPM | SYSTOLIC BLOOD PRESSURE: 111 MMHG | DIASTOLIC BLOOD PRESSURE: 68 MMHG

## 2024-11-11 PROCEDURE — 99204 OFFICE O/P NEW MOD 45 MIN: CPT

## 2024-11-11 RX ORDER — ATORVASTATIN CALCIUM 80 MG/1
80 TABLET, FILM COATED ORAL
Refills: 0 | Status: ACTIVE | COMMUNITY

## 2024-11-11 RX ORDER — ASPIRIN 81 MG
81 TABLET, DELAYED RELEASE (ENTERIC COATED) ORAL
Refills: 0 | Status: ACTIVE | COMMUNITY

## 2024-11-12 ENCOUNTER — APPOINTMENT (OUTPATIENT)
Dept: NEUROLOGY | Facility: CLINIC | Age: 74
End: 2024-11-12
Payer: MEDICARE

## 2024-11-12 DIAGNOSIS — G45.9 TRANSIENT CEREBRAL ISCHEMIC ATTACK, UNSPECIFIED: ICD-10-CM

## 2024-11-12 DIAGNOSIS — R29.818 OTHER SYMPTOMS AND SIGNS INVOLVING THE NERVOUS SYSTEM: ICD-10-CM

## 2024-11-12 DIAGNOSIS — G91.9 HYDROCEPHALUS, UNSPECIFIED: ICD-10-CM

## 2024-11-12 PROCEDURE — 99204 OFFICE O/P NEW MOD 45 MIN: CPT

## 2024-11-12 PROCEDURE — G2211 COMPLEX E/M VISIT ADD ON: CPT

## 2024-11-19 RX ORDER — CLOPIDOGREL BISULFATE 75 MG/1
75 TABLET, FILM COATED ORAL
Refills: 0 | Status: DISCONTINUED | COMMUNITY
End: 2024-11-19

## 2024-12-02 ENCOUNTER — NON-APPOINTMENT (OUTPATIENT)
Age: 74
End: 2024-12-02

## 2024-12-02 ENCOUNTER — APPOINTMENT (OUTPATIENT)
Dept: NEUROLOGY | Facility: CLINIC | Age: 74
End: 2024-12-02